# Patient Record
Sex: FEMALE | HISPANIC OR LATINO | URBAN - METROPOLITAN AREA
[De-identification: names, ages, dates, MRNs, and addresses within clinical notes are randomized per-mention and may not be internally consistent; named-entity substitution may affect disease eponyms.]

---

## 2022-11-01 ENCOUNTER — OFFICE (OUTPATIENT)
Dept: URBAN - METROPOLITAN AREA CLINIC 94 | Facility: CLINIC | Age: 77
Setting detail: OPHTHALMOLOGY
End: 2022-11-01
Payer: COMMERCIAL

## 2022-11-01 DIAGNOSIS — Z20.822: ICD-10-CM

## 2022-11-01 DIAGNOSIS — Z01.812: ICD-10-CM

## 2022-11-01 PROCEDURE — 99211 OFF/OP EST MAY X REQ PHY/QHP: CPT | Performed by: OPHTHALMOLOGY

## 2022-11-01 ASSESSMENT — REFRACTION_CURRENTRX
OD_VPRISM_DIRECTION: PROGS
OS_SPHERE: -0.5-
OS_SPHERE: +0.50
OS_CYLINDER: -1.00
OD_SPHERE: +0.50
OS_OVR_VA: 20/
OS_AXIS: 78
OD_ADD: +2.50
OD_OVR_VA: 20/
OD_OVR_VA: 20/
OS_OVR_VA: 20/
OS_ADD: +2.25
OS_AXIS: 097
OD_AXIS: 112
OS_CYLINDER: -1.00
OS_ADD: +2.50
OD_CYLINDER: -1.50
OD_AXIS: 113
OD_CYLINDER: -1.25
OD_SPHERE: -0.50
OD_ADD: +2.25

## 2022-11-01 ASSESSMENT — REFRACTION_MANIFEST
OS_VA1: 20/25
OS_CYLINDER: -2.00
OS_ADD: +2.50
OD_AXIS: 110
OD_SPHERE: PL
OS_CYLINDER: -1.25
OD_CYLINDER: -1.25
OS_AXIS: 088
OD_VA1: 20/20
OD_VA1: 20/25
OD_SPHERE: 0.00
OD_ADD: +2.50
OD_AXIS: 115
OS_AXIS: 85
OS_SPHERE: PL
OS_VA1: 20/20
OS_SPHERE: +0.25
OD_CYLINDER: -2.00

## 2022-11-01 ASSESSMENT — SPHEQUIV_DERIVED
OS_SPHEQUIV: -0.75
OD_SPHEQUIV: -1
OS_SPHEQUIV: -0.75
OD_SPHEQUIV: -1

## 2022-11-01 ASSESSMENT — REFRACTION_AUTOREFRACTION
OS_AXIS: 088
OD_AXIS: 115
OD_SPHERE: 0.00
OS_SPHERE: +0.25
OS_CYLINDER: -2.00
OD_CYLINDER: -2.00

## 2022-11-01 ASSESSMENT — AXIALLENGTH_DERIVED
OD_AL: 23.7066
OS_AL: 23.5627
OS_AL: 23.5627
OD_AL: 23.7066

## 2022-11-01 ASSESSMENT — KERATOMETRY
OD_K2POWER_DIOPTERS: 44.75
OS_K2POWER_DIOPTERS: 45.00
OD_K1POWER_DIOPTERS: 43.75
METHOD_AUTO_MANUAL: AUTO
OD_AXISANGLE_DEGREES: 035
OS_AXISANGLE_DEGREES: 177
OS_K1POWER_DIOPTERS: 43.75

## 2022-11-01 ASSESSMENT — TEAR BREAK UP TIME (TBUT)
OS_TBUT: 2+
OD_TBUT: 1+

## 2022-11-01 ASSESSMENT — VISUAL ACUITY
OD_BCVA: 20/25
OS_BCVA: 20/25-1

## 2022-11-04 ENCOUNTER — ASC (OUTPATIENT)
Dept: URBAN - METROPOLITAN AREA SURGERY 8 | Facility: SURGERY | Age: 77
Setting detail: OPHTHALMOLOGY
End: 2022-11-04
Payer: COMMERCIAL

## 2022-11-04 DIAGNOSIS — H26.492: ICD-10-CM

## 2022-11-04 PROCEDURE — 66821 AFTER CATARACT LASER SURGERY: CPT | Performed by: OPHTHALMOLOGY

## 2022-11-04 ASSESSMENT — AXIALLENGTH_DERIVED
OS_AL: 23.5627
OD_AL: 23.7066
OS_AL: 23.5627
OD_AL: 23.7066

## 2022-11-04 ASSESSMENT — SPHEQUIV_DERIVED
OS_SPHEQUIV: -0.75
OD_SPHEQUIV: -1
OS_SPHEQUIV: -0.75
OD_SPHEQUIV: -1

## 2022-11-04 ASSESSMENT — REFRACTION_MANIFEST
OD_CYLINDER: -2.00
OS_VA1: 20/20
OS_CYLINDER: -2.00
OD_AXIS: 115
OD_VA1: 20/25
OS_AXIS: 088
OD_SPHERE: PL
OD_SPHERE: 0.00
OS_SPHERE: +0.25
OD_ADD: +2.50
OD_VA1: 20/20
OS_SPHERE: PL
OD_CYLINDER: -1.25
OS_VA1: 20/25
OS_CYLINDER: -1.25
OD_AXIS: 110
OS_AXIS: 85
OS_ADD: +2.50

## 2022-11-04 ASSESSMENT — REFRACTION_AUTOREFRACTION
OD_SPHERE: 0.00
OD_AXIS: 115
OS_SPHERE: +0.25
OD_CYLINDER: -2.00
OS_AXIS: 088
OS_CYLINDER: -2.00

## 2022-11-04 ASSESSMENT — REFRACTION_CURRENTRX
OD_CYLINDER: -1.25
OS_CYLINDER: -1.00
OS_ADD: +2.50
OS_OVR_VA: 20/
OD_SPHERE: +0.50
OD_CYLINDER: -1.50
OS_CYLINDER: -1.00
OS_SPHERE: +0.50
OD_OVR_VA: 20/
OS_AXIS: 097
OS_SPHERE: -0.5-
OD_ADD: +2.50
OD_ADD: +2.25
OD_SPHERE: -0.50
OS_AXIS: 78
OD_OVR_VA: 20/
OS_OVR_VA: 20/
OD_AXIS: 113
OD_AXIS: 112
OS_ADD: +2.25
OD_VPRISM_DIRECTION: PROGS

## 2022-11-04 ASSESSMENT — KERATOMETRY
OD_K1POWER_DIOPTERS: 43.75
METHOD_AUTO_MANUAL: AUTO
OS_AXISANGLE_DEGREES: 177
OD_AXISANGLE_DEGREES: 035
OD_K2POWER_DIOPTERS: 44.75
OS_K1POWER_DIOPTERS: 43.75
OS_K2POWER_DIOPTERS: 45.00

## 2022-11-04 ASSESSMENT — TEAR BREAK UP TIME (TBUT)
OS_TBUT: 2+
OD_TBUT: 1+

## 2022-11-04 ASSESSMENT — VISUAL ACUITY
OD_BCVA: 20/25
OS_BCVA: 20/25-1

## 2022-12-02 ENCOUNTER — OFFICE (OUTPATIENT)
Dept: URBAN - METROPOLITAN AREA CLINIC 94 | Facility: CLINIC | Age: 77
Setting detail: OPHTHALMOLOGY
End: 2022-12-02
Payer: COMMERCIAL

## 2022-12-02 DIAGNOSIS — H26.492: ICD-10-CM

## 2022-12-02 DIAGNOSIS — H26.491: ICD-10-CM

## 2022-12-02 PROCEDURE — 99024 POSTOP FOLLOW-UP VISIT: CPT | Performed by: OPHTHALMOLOGY

## 2022-12-02 ASSESSMENT — REFRACTION_MANIFEST
OS_AXIS: 85
OS_SPHERE: +0.25
OD_VA1: 20/25
OD_SPHERE: 0.00
OS_CYLINDER: -2.00
OS_SPHERE: PL
OS_VA1: 20/25
OS_VA1: 20/20
OD_SPHERE: PL
OS_AXIS: 088
OS_ADD: +2.50
OD_VA1: 20/20
OD_AXIS: 115
OD_AXIS: 110
OS_CYLINDER: -1.25
OD_CYLINDER: -2.00
OD_ADD: +2.50
OD_CYLINDER: -1.25

## 2022-12-02 ASSESSMENT — AXIALLENGTH_DERIVED
OD_AL: 23.7997
OS_AL: 23.517
OD_AL: 23.6519
OS_AL: 23.3728

## 2022-12-02 ASSESSMENT — KERATOMETRY
OS_K2POWER_DIOPTERS: 45.00
OS_K1POWER_DIOPTERS: 44.00
OD_K2POWER_DIOPTERS: 44.50
OD_K1POWER_DIOPTERS: 43.50
OS_AXISANGLE_DEGREES: 174
METHOD_AUTO_MANUAL: AUTO
OD_AXISANGLE_DEGREES: 032

## 2022-12-02 ASSESSMENT — REFRACTION_CURRENTRX
OD_VPRISM_DIRECTION: PROGS
OD_ADD: +2.50
OD_OVR_VA: 20/
OS_SPHERE: +0.50
OS_ADD: +2.50
OS_AXIS: 78
OS_CYLINDER: -1.00
OD_SPHERE: +0.50
OD_AXIS: 112
OD_CYLINDER: -1.50
OD_OVR_VA: 20/
OD_ADD: +2.25
OS_OVR_VA: 20/
OS_ADD: +2.25
OS_OVR_VA: 20/
OD_CYLINDER: -1.25
OS_SPHERE: -0.5-
OD_SPHERE: -0.50
OS_AXIS: 097
OS_CYLINDER: -1.00
OD_AXIS: 113

## 2022-12-02 ASSESSMENT — VISUAL ACUITY
OS_BCVA: 20/20
OD_BCVA: 20/25-1

## 2022-12-02 ASSESSMENT — SPHEQUIV_DERIVED
OD_SPHEQUIV: -1
OS_SPHEQUIV: -0.375
OS_SPHEQUIV: -0.75
OD_SPHEQUIV: -0.625

## 2022-12-02 ASSESSMENT — REFRACTION_AUTOREFRACTION
OS_CYLINDER: -1.75
OS_SPHERE: +0.50
OD_SPHERE: +0.25
OD_CYLINDER: -1.75
OS_AXIS: 089
OD_AXIS: 113

## 2022-12-02 ASSESSMENT — TEAR BREAK UP TIME (TBUT)
OS_TBUT: 2+
OD_TBUT: 1+

## 2022-12-02 ASSESSMENT — CONFRONTATIONAL VISUAL FIELD TEST (CVF)
OS_FINDINGS: FULL
OD_FINDINGS: FULL

## 2022-12-02 ASSESSMENT — TONOMETRY
OD_IOP_MMHG: 18
OS_IOP_MMHG: 21

## 2023-03-10 ENCOUNTER — OFFICE (OUTPATIENT)
Dept: URBAN - METROPOLITAN AREA CLINIC 94 | Facility: CLINIC | Age: 78
Setting detail: OPHTHALMOLOGY
End: 2023-03-10
Payer: COMMERCIAL

## 2023-03-10 DIAGNOSIS — H04.123: ICD-10-CM

## 2023-03-10 DIAGNOSIS — H43.393: ICD-10-CM

## 2023-03-10 PROCEDURE — 99213 OFFICE O/P EST LOW 20 MIN: CPT | Performed by: OPHTHALMOLOGY

## 2023-03-10 ASSESSMENT — KERATOMETRY
OS_K2POWER_DIOPTERS: 45.00
OD_K2POWER_DIOPTERS: 44.75
OS_AXISANGLE_DEGREES: 176
OS_K1POWER_DIOPTERS: 43.50
METHOD_AUTO_MANUAL: AUTO
OD_AXISANGLE_DEGREES: 030
OD_K1POWER_DIOPTERS: 43.25

## 2023-03-10 ASSESSMENT — REFRACTION_MANIFEST
OS_CYLINDER: -2.00
OS_ADD: +2.50
OS_CYLINDER: -1.25
OD_SPHERE: PL
OD_VA1: 20/20
OS_SPHERE: +0.25
OD_SPHERE: 0.00
OS_AXIS: 85
OS_VA1: 20/20
OS_VA1: 20/25
OD_CYLINDER: -2.00
OD_CYLINDER: -1.25
OD_AXIS: 110
OD_ADD: +2.50
OS_AXIS: 088
OD_VA1: 20/25
OS_SPHERE: PL
OD_AXIS: 115

## 2023-03-10 ASSESSMENT — AXIALLENGTH_DERIVED
OS_AL: 23.3673
OS_AL: 23.6086
OD_AL: 23.7997
OD_AL: 23.6519

## 2023-03-10 ASSESSMENT — CONFRONTATIONAL VISUAL FIELD TEST (CVF)
OS_FINDINGS: FULL
OD_FINDINGS: FULL

## 2023-03-10 ASSESSMENT — REFRACTION_CURRENTRX
OD_CYLINDER: -1.25
OD_SPHERE: +0.50
OD_AXIS: 113
OD_SPHERE: -0.50
OD_AXIS: 112
OS_AXIS: 097
OS_CYLINDER: -1.00
OD_VPRISM_DIRECTION: PROGS
OD_ADD: +2.25
OD_OVR_VA: 20/
OS_SPHERE: -0.5-
OS_CYLINDER: -1.00
OS_OVR_VA: 20/
OS_OVR_VA: 20/
OS_SPHERE: +0.50
OD_CYLINDER: -1.50
OD_OVR_VA: 20/
OS_ADD: +2.50
OD_ADD: +2.50
OS_ADD: +2.25
OS_AXIS: 78

## 2023-03-10 ASSESSMENT — TEAR BREAK UP TIME (TBUT)
OD_TBUT: 1+
OS_TBUT: 2+

## 2023-03-10 ASSESSMENT — REFRACTION_AUTOREFRACTION
OD_SPHERE: +0.50
OD_AXIS: 114
OS_SPHERE: +1.00
OS_AXIS: 087
OS_CYLINDER: -2.25
OD_CYLINDER: -2.25

## 2023-03-10 ASSESSMENT — TONOMETRY
OS_IOP_MMHG: 12
OD_IOP_MMHG: 12

## 2023-03-10 ASSESSMENT — VISUAL ACUITY
OD_BCVA: 20/20
OS_BCVA: 20/20

## 2023-03-10 ASSESSMENT — SPHEQUIV_DERIVED
OD_SPHEQUIV: -0.625
OS_SPHEQUIV: -0.75
OS_SPHEQUIV: -0.125
OD_SPHEQUIV: -1

## 2023-09-08 ENCOUNTER — OFFICE (OUTPATIENT)
Dept: URBAN - METROPOLITAN AREA CLINIC 94 | Facility: CLINIC | Age: 78
Setting detail: OPHTHALMOLOGY
End: 2023-09-08
Payer: MEDICARE

## 2023-09-08 DIAGNOSIS — H43.393: ICD-10-CM

## 2023-09-08 DIAGNOSIS — H04.123: ICD-10-CM

## 2023-09-08 PROCEDURE — 92250 FUNDUS PHOTOGRAPHY W/I&R: CPT | Performed by: OPHTHALMOLOGY

## 2023-09-08 PROCEDURE — 92014 COMPRE OPH EXAM EST PT 1/>: CPT | Performed by: OPHTHALMOLOGY

## 2023-09-08 ASSESSMENT — KERATOMETRY
OS_K2POWER_DIOPTERS: 45.00
METHOD_AUTO_MANUAL: AUTO
OS_AXISANGLE_DEGREES: 176
OS_K1POWER_DIOPTERS: 43.50
OD_AXISANGLE_DEGREES: 030
OD_K1POWER_DIOPTERS: 43.25
OD_K2POWER_DIOPTERS: 44.75

## 2023-09-08 ASSESSMENT — REFRACTION_MANIFEST
OD_CYLINDER: -2.00
OD_VA1: 20/25
OD_SPHERE: PL
OD_AXIS: 110
OD_VA1: 20/20
OS_CYLINDER: -1.25
OS_ADD: +2.50
OS_AXIS: 088
OS_CYLINDER: -2.00
OS_VA1: 20/25
OD_AXIS: 115
OD_SPHERE: 0.00
OD_CYLINDER: -1.25
OS_SPHERE: PL
OD_ADD: +2.50
OS_VA1: 20/20
OS_AXIS: 85
OS_SPHERE: +0.25

## 2023-09-08 ASSESSMENT — REFRACTION_AUTOREFRACTION
OS_CYLINDER: -2.00
OS_AXIS: 087
OS_SPHERE: +0.50
OD_SPHERE: 0.00
OD_CYLINDER: -1.50
OD_AXIS: 111

## 2023-09-08 ASSESSMENT — TEAR BREAK UP TIME (TBUT)
OS_TBUT: 2+
OD_TBUT: 1+

## 2023-09-08 ASSESSMENT — LID POSITION - PTOSIS
OS_PTOSIS: 1+
OD_PTOSIS: 1+

## 2023-09-08 ASSESSMENT — REFRACTION_CURRENTRX
OS_ADD: +2.25
OD_OVR_VA: 20/
OD_AXIS: 112
OD_CYLINDER: -1.50
OD_OVR_VA: 20/
OD_AXIS: 113
OS_AXIS: 78
OD_CYLINDER: -1.25
OS_CYLINDER: -1.00
OD_SPHERE: +0.50
OS_SPHERE: +0.50
OS_ADD: +2.50
OS_CYLINDER: -1.00
OD_SPHERE: -0.50
OD_VPRISM_DIRECTION: PROGS
OD_ADD: +2.50
OS_OVR_VA: 20/
OD_ADD: +2.25
OS_OVR_VA: 20/
OS_AXIS: 097
OS_SPHERE: -0.5-

## 2023-09-08 ASSESSMENT — AXIALLENGTH_DERIVED
OD_AL: 23.701
OS_AL: 23.6086
OS_AL: 23.5115
OD_AL: 23.7997

## 2023-09-08 ASSESSMENT — SPHEQUIV_DERIVED
OD_SPHEQUIV: -1
OD_SPHEQUIV: -0.75
OS_SPHEQUIV: -0.5
OS_SPHEQUIV: -0.75

## 2023-09-08 ASSESSMENT — VISUAL ACUITY
OS_BCVA: 20/25
OD_BCVA: 20/25

## 2023-09-08 ASSESSMENT — TONOMETRY
OD_IOP_MMHG: 20
OS_IOP_MMHG: 20

## 2023-09-08 ASSESSMENT — CONFRONTATIONAL VISUAL FIELD TEST (CVF)
OS_FINDINGS: FULL
OD_FINDINGS: FULL

## 2023-09-22 ENCOUNTER — OFFICE (OUTPATIENT)
Dept: URBAN - METROPOLITAN AREA CLINIC 94 | Facility: CLINIC | Age: 78
Setting detail: OPHTHALMOLOGY
End: 2023-09-22
Payer: MEDICARE

## 2023-09-22 DIAGNOSIS — H02.521: ICD-10-CM

## 2023-09-22 DIAGNOSIS — H04.123: ICD-10-CM

## 2023-09-22 DIAGNOSIS — H02.524: ICD-10-CM

## 2023-09-22 DIAGNOSIS — H02.423: ICD-10-CM

## 2023-09-22 DIAGNOSIS — H02.831: ICD-10-CM

## 2023-09-22 PROBLEM — H02.422 PTOSIS MYOGENIC; RIGHT EYE, LEFT EYE, BOTH EYES: Status: ACTIVE | Noted: 2023-09-22

## 2023-09-22 PROBLEM — H02.834 DERMATOCHALASIS; RIGHT UPPER LID, LEFT UPPER LID: Status: ACTIVE | Noted: 2023-09-22

## 2023-09-22 PROBLEM — H02.421 PTOSIS MYOGENIC; RIGHT EYE, LEFT EYE, BOTH EYES: Status: ACTIVE | Noted: 2023-09-22

## 2023-09-22 PROCEDURE — 92082 INTERMEDIATE VISUAL FIELD XM: CPT | Performed by: OPHTHALMOLOGY

## 2023-09-22 PROCEDURE — 92285 EXTERNAL OCULAR PHOTOGRAPHY: CPT | Performed by: OPHTHALMOLOGY

## 2023-09-22 PROCEDURE — 99214 OFFICE O/P EST MOD 30 MIN: CPT | Performed by: OPHTHALMOLOGY

## 2023-09-22 PROCEDURE — 83861 MICROFLUID ANALY TEARS: CPT | Performed by: OPHTHALMOLOGY

## 2023-09-22 ASSESSMENT — SPHEQUIV_DERIVED
OS_SPHEQUIV: -0.375
OS_SPHEQUIV: -0.75
OD_SPHEQUIV: -1
OD_SPHEQUIV: -0.75

## 2023-09-22 ASSESSMENT — REFRACTION_MANIFEST
OS_CYLINDER: -2.00
OD_AXIS: 115
OD_ADD: +2.50
OD_CYLINDER: -2.00
OD_AXIS: 110
OD_CYLINDER: -1.25
OS_AXIS: 85
OS_ADD: +2.50
OS_VA1: 20/25
OD_VA1: 20/25
OS_AXIS: 088
OD_VA1: 20/20
OD_SPHERE: 0.00
OD_SPHERE: PL
OS_SPHERE: PL
OS_VA1: 20/20
OS_CYLINDER: -1.25
OS_SPHERE: +0.25

## 2023-09-22 ASSESSMENT — REFRACTION_CURRENTRX
OD_ADD: +2.25
OS_SPHERE: +0.50
OS_AXIS: 78
OD_CYLINDER: -1.50
OS_OVR_VA: 20/
OS_SPHERE: -0.5-
OS_AXIS: 097
OD_VPRISM_DIRECTION: PROGS
OD_OVR_VA: 20/
OD_AXIS: 112
OS_ADD: +2.25
OS_CYLINDER: -1.00
OD_SPHERE: -0.50
OD_SPHERE: +0.50
OS_CYLINDER: -1.00
OS_ADD: +2.50
OD_OVR_VA: 20/
OD_ADD: +2.50
OS_OVR_VA: 20/
OD_CYLINDER: -1.25
OD_AXIS: 113

## 2023-09-22 ASSESSMENT — REFRACTION_AUTOREFRACTION
OS_SPHERE: +0.75
OD_CYLINDER: -2.00
OS_AXIS: 094
OD_SPHERE: +0.25
OS_CYLINDER: -2.25
OD_AXIS: 115

## 2023-09-22 ASSESSMENT — TEAR BREAK UP TIME (TBUT)
OS_TBUT: 2+
OD_TBUT: 1+

## 2023-09-22 ASSESSMENT — TONOMETRY
OD_IOP_MMHG: 18
OS_IOP_MMHG: 19

## 2023-09-22 ASSESSMENT — KERATOMETRY
OS_AXISANGLE_DEGREES: 180
OD_AXISANGLE_DEGREES: 030
OD_K1POWER_DIOPTERS: 43.50
OD_K2POWER_DIOPTERS: 44.75
OS_K2POWER_DIOPTERS: 45.00
METHOD_AUTO_MANUAL: AUTO
OS_K1POWER_DIOPTERS: 44.00

## 2023-09-22 ASSESSMENT — CONFRONTATIONAL VISUAL FIELD TEST (CVF)
OD_FINDINGS: FULL
OS_FINDINGS: FULL

## 2023-09-22 ASSESSMENT — AXIALLENGTH_DERIVED
OS_AL: 23.517
OD_AL: 23.753
OD_AL: 23.6547
OS_AL: 23.3728

## 2023-09-22 ASSESSMENT — VISUAL ACUITY
OD_BCVA: 20/25-1
OS_BCVA: 20/25

## 2023-10-17 ENCOUNTER — INPATIENT (INPATIENT)
Facility: HOSPITAL | Age: 78
LOS: 0 days | Discharge: HOME | DRG: 310 | End: 2023-10-18
Attending: STUDENT IN AN ORGANIZED HEALTH CARE EDUCATION/TRAINING PROGRAM | Admitting: STUDENT IN AN ORGANIZED HEALTH CARE EDUCATION/TRAINING PROGRAM
Payer: MEDICARE

## 2023-10-17 VITALS
HEIGHT: 57 IN | RESPIRATION RATE: 20 BRPM | HEART RATE: 70 BPM | DIASTOLIC BLOOD PRESSURE: 65 MMHG | SYSTOLIC BLOOD PRESSURE: 98 MMHG | OXYGEN SATURATION: 98 % | WEIGHT: 112.44 LBS | TEMPERATURE: 98 F

## 2023-10-17 DIAGNOSIS — I48.91 UNSPECIFIED ATRIAL FIBRILLATION: ICD-10-CM

## 2023-10-17 LAB
ALBUMIN SERPL ELPH-MCNC: 4.1 G/DL — SIGNIFICANT CHANGE UP (ref 3.3–5.2)
ALBUMIN SERPL ELPH-MCNC: 4.1 G/DL — SIGNIFICANT CHANGE UP (ref 3.3–5.2)
ALP SERPL-CCNC: 110 U/L — SIGNIFICANT CHANGE UP (ref 40–120)
ALP SERPL-CCNC: 110 U/L — SIGNIFICANT CHANGE UP (ref 40–120)
ALT FLD-CCNC: 29 U/L — SIGNIFICANT CHANGE UP
ALT FLD-CCNC: 29 U/L — SIGNIFICANT CHANGE UP
ANION GAP SERPL CALC-SCNC: 13 MMOL/L — SIGNIFICANT CHANGE UP (ref 5–17)
ANION GAP SERPL CALC-SCNC: 13 MMOL/L — SIGNIFICANT CHANGE UP (ref 5–17)
APTT BLD: 37.7 SEC — HIGH (ref 24.5–35.6)
APTT BLD: 37.7 SEC — HIGH (ref 24.5–35.6)
AST SERPL-CCNC: 21 U/L — SIGNIFICANT CHANGE UP
AST SERPL-CCNC: 21 U/L — SIGNIFICANT CHANGE UP
BASOPHILS # BLD AUTO: 0.05 K/UL — SIGNIFICANT CHANGE UP (ref 0–0.2)
BASOPHILS # BLD AUTO: 0.05 K/UL — SIGNIFICANT CHANGE UP (ref 0–0.2)
BASOPHILS NFR BLD AUTO: 0.4 % — SIGNIFICANT CHANGE UP (ref 0–2)
BASOPHILS NFR BLD AUTO: 0.4 % — SIGNIFICANT CHANGE UP (ref 0–2)
BILIRUB SERPL-MCNC: 0.6 MG/DL — SIGNIFICANT CHANGE UP (ref 0.4–2)
BILIRUB SERPL-MCNC: 0.6 MG/DL — SIGNIFICANT CHANGE UP (ref 0.4–2)
BUN SERPL-MCNC: 11.9 MG/DL — SIGNIFICANT CHANGE UP (ref 8–20)
BUN SERPL-MCNC: 11.9 MG/DL — SIGNIFICANT CHANGE UP (ref 8–20)
CALCIUM SERPL-MCNC: 9.8 MG/DL — SIGNIFICANT CHANGE UP (ref 8.4–10.5)
CALCIUM SERPL-MCNC: 9.8 MG/DL — SIGNIFICANT CHANGE UP (ref 8.4–10.5)
CHLORIDE SERPL-SCNC: 101 MMOL/L — SIGNIFICANT CHANGE UP (ref 96–108)
CHLORIDE SERPL-SCNC: 101 MMOL/L — SIGNIFICANT CHANGE UP (ref 96–108)
CO2 SERPL-SCNC: 26 MMOL/L — SIGNIFICANT CHANGE UP (ref 22–29)
CO2 SERPL-SCNC: 26 MMOL/L — SIGNIFICANT CHANGE UP (ref 22–29)
CREAT SERPL-MCNC: 0.69 MG/DL — SIGNIFICANT CHANGE UP (ref 0.5–1.3)
CREAT SERPL-MCNC: 0.69 MG/DL — SIGNIFICANT CHANGE UP (ref 0.5–1.3)
EGFR: 90 ML/MIN/1.73M2 — SIGNIFICANT CHANGE UP
EGFR: 90 ML/MIN/1.73M2 — SIGNIFICANT CHANGE UP
EOSINOPHIL # BLD AUTO: 0.02 K/UL — SIGNIFICANT CHANGE UP (ref 0–0.5)
EOSINOPHIL # BLD AUTO: 0.02 K/UL — SIGNIFICANT CHANGE UP (ref 0–0.5)
EOSINOPHIL NFR BLD AUTO: 0.2 % — SIGNIFICANT CHANGE UP (ref 0–6)
EOSINOPHIL NFR BLD AUTO: 0.2 % — SIGNIFICANT CHANGE UP (ref 0–6)
GLUCOSE SERPL-MCNC: 135 MG/DL — HIGH (ref 70–99)
GLUCOSE SERPL-MCNC: 135 MG/DL — HIGH (ref 70–99)
HCT VFR BLD CALC: 40.1 % — SIGNIFICANT CHANGE UP (ref 34.5–45)
HCT VFR BLD CALC: 40.1 % — SIGNIFICANT CHANGE UP (ref 34.5–45)
HGB BLD-MCNC: 13.4 G/DL — SIGNIFICANT CHANGE UP (ref 11.5–15.5)
HGB BLD-MCNC: 13.4 G/DL — SIGNIFICANT CHANGE UP (ref 11.5–15.5)
IMM GRANULOCYTES NFR BLD AUTO: 0.2 % — SIGNIFICANT CHANGE UP (ref 0–0.9)
IMM GRANULOCYTES NFR BLD AUTO: 0.2 % — SIGNIFICANT CHANGE UP (ref 0–0.9)
LYMPHOCYTES # BLD AUTO: 19.4 % — SIGNIFICANT CHANGE UP (ref 13–44)
LYMPHOCYTES # BLD AUTO: 19.4 % — SIGNIFICANT CHANGE UP (ref 13–44)
LYMPHOCYTES # BLD AUTO: 2.48 K/UL — SIGNIFICANT CHANGE UP (ref 1–3.3)
LYMPHOCYTES # BLD AUTO: 2.48 K/UL — SIGNIFICANT CHANGE UP (ref 1–3.3)
MAGNESIUM SERPL-MCNC: 2.2 MG/DL — SIGNIFICANT CHANGE UP (ref 1.6–2.6)
MAGNESIUM SERPL-MCNC: 2.2 MG/DL — SIGNIFICANT CHANGE UP (ref 1.6–2.6)
MCHC RBC-ENTMCNC: 28.1 PG — SIGNIFICANT CHANGE UP (ref 27–34)
MCHC RBC-ENTMCNC: 28.1 PG — SIGNIFICANT CHANGE UP (ref 27–34)
MCHC RBC-ENTMCNC: 33.4 GM/DL — SIGNIFICANT CHANGE UP (ref 32–36)
MCHC RBC-ENTMCNC: 33.4 GM/DL — SIGNIFICANT CHANGE UP (ref 32–36)
MCV RBC AUTO: 84.1 FL — SIGNIFICANT CHANGE UP (ref 80–100)
MCV RBC AUTO: 84.1 FL — SIGNIFICANT CHANGE UP (ref 80–100)
MONOCYTES # BLD AUTO: 1.12 K/UL — HIGH (ref 0–0.9)
MONOCYTES # BLD AUTO: 1.12 K/UL — HIGH (ref 0–0.9)
MONOCYTES NFR BLD AUTO: 8.8 % — SIGNIFICANT CHANGE UP (ref 2–14)
MONOCYTES NFR BLD AUTO: 8.8 % — SIGNIFICANT CHANGE UP (ref 2–14)
NEUTROPHILS # BLD AUTO: 9.1 K/UL — HIGH (ref 1.8–7.4)
NEUTROPHILS # BLD AUTO: 9.1 K/UL — HIGH (ref 1.8–7.4)
NEUTROPHILS NFR BLD AUTO: 71 % — SIGNIFICANT CHANGE UP (ref 43–77)
NEUTROPHILS NFR BLD AUTO: 71 % — SIGNIFICANT CHANGE UP (ref 43–77)
NT-PROBNP SERPL-SCNC: 488 PG/ML — HIGH (ref 0–300)
NT-PROBNP SERPL-SCNC: 488 PG/ML — HIGH (ref 0–300)
PLATELET # BLD AUTO: 280 K/UL — SIGNIFICANT CHANGE UP (ref 150–400)
PLATELET # BLD AUTO: 280 K/UL — SIGNIFICANT CHANGE UP (ref 150–400)
POTASSIUM SERPL-MCNC: 4 MMOL/L — SIGNIFICANT CHANGE UP (ref 3.5–5.3)
POTASSIUM SERPL-MCNC: 4 MMOL/L — SIGNIFICANT CHANGE UP (ref 3.5–5.3)
POTASSIUM SERPL-SCNC: 4 MMOL/L — SIGNIFICANT CHANGE UP (ref 3.5–5.3)
POTASSIUM SERPL-SCNC: 4 MMOL/L — SIGNIFICANT CHANGE UP (ref 3.5–5.3)
PROT SERPL-MCNC: 6.8 G/DL — SIGNIFICANT CHANGE UP (ref 6.6–8.7)
PROT SERPL-MCNC: 6.8 G/DL — SIGNIFICANT CHANGE UP (ref 6.6–8.7)
RAPID RVP RESULT: SIGNIFICANT CHANGE UP
RAPID RVP RESULT: SIGNIFICANT CHANGE UP
RBC # BLD: 4.77 M/UL — SIGNIFICANT CHANGE UP (ref 3.8–5.2)
RBC # BLD: 4.77 M/UL — SIGNIFICANT CHANGE UP (ref 3.8–5.2)
RBC # FLD: 14.3 % — SIGNIFICANT CHANGE UP (ref 10.3–14.5)
RBC # FLD: 14.3 % — SIGNIFICANT CHANGE UP (ref 10.3–14.5)
SARS-COV-2 RNA SPEC QL NAA+PROBE: SIGNIFICANT CHANGE UP
SARS-COV-2 RNA SPEC QL NAA+PROBE: SIGNIFICANT CHANGE UP
SODIUM SERPL-SCNC: 140 MMOL/L — SIGNIFICANT CHANGE UP (ref 135–145)
SODIUM SERPL-SCNC: 140 MMOL/L — SIGNIFICANT CHANGE UP (ref 135–145)
TROPONIN T SERPL-MCNC: <0.01 NG/ML — SIGNIFICANT CHANGE UP (ref 0–0.06)
WBC # BLD: 12.8 K/UL — HIGH (ref 3.8–10.5)
WBC # BLD: 12.8 K/UL — HIGH (ref 3.8–10.5)
WBC # FLD AUTO: 12.8 K/UL — HIGH (ref 3.8–10.5)
WBC # FLD AUTO: 12.8 K/UL — HIGH (ref 3.8–10.5)

## 2023-10-17 PROCEDURE — 99223 1ST HOSP IP/OBS HIGH 75: CPT

## 2023-10-17 PROCEDURE — 99285 EMERGENCY DEPT VISIT HI MDM: CPT

## 2023-10-17 PROCEDURE — 93010 ELECTROCARDIOGRAM REPORT: CPT | Mod: 76

## 2023-10-17 PROCEDURE — 99222 1ST HOSP IP/OBS MODERATE 55: CPT

## 2023-10-17 PROCEDURE — 71045 X-RAY EXAM CHEST 1 VIEW: CPT | Mod: 26

## 2023-10-17 PROCEDURE — 93306 TTE W/DOPPLER COMPLETE: CPT | Mod: 26

## 2023-10-17 RX ORDER — ACETAMINOPHEN 500 MG
650 TABLET ORAL EVERY 6 HOURS
Refills: 0 | Status: DISCONTINUED | OUTPATIENT
Start: 2023-10-17 | End: 2023-10-18

## 2023-10-17 RX ORDER — FAMOTIDINE 10 MG/ML
20 INJECTION INTRAVENOUS DAILY
Refills: 0 | Status: DISCONTINUED | OUTPATIENT
Start: 2023-10-17 | End: 2023-10-18

## 2023-10-17 RX ORDER — ENOXAPARIN SODIUM 100 MG/ML
50 INJECTION SUBCUTANEOUS EVERY 12 HOURS
Refills: 0 | Status: DISCONTINUED | OUTPATIENT
Start: 2023-10-17 | End: 2023-10-18

## 2023-10-17 RX ORDER — ATORVASTATIN CALCIUM 80 MG/1
1 TABLET, FILM COATED ORAL
Refills: 0 | DISCHARGE

## 2023-10-17 RX ORDER — CALCIUM CARBONATE 500(1250)
0 TABLET ORAL
Refills: 0 | DISCHARGE

## 2023-10-17 RX ORDER — METOPROLOL TARTRATE 50 MG
25 TABLET ORAL
Refills: 0 | Status: DISCONTINUED | OUTPATIENT
Start: 2023-10-17 | End: 2023-10-18

## 2023-10-17 RX ORDER — HYDROCHLOROTHIAZIDE 25 MG
1 TABLET ORAL
Refills: 0 | DISCHARGE

## 2023-10-17 RX ORDER — HEPARIN SODIUM 5000 [USP'U]/ML
4000 INJECTION INTRAVENOUS; SUBCUTANEOUS ONCE
Refills: 0 | Status: DISCONTINUED | OUTPATIENT
Start: 2023-10-17 | End: 2023-10-17

## 2023-10-17 RX ORDER — ALENDRONATE SODIUM 70 MG/1
1 TABLET ORAL
Refills: 0 | DISCHARGE

## 2023-10-17 RX ORDER — ATORVASTATIN CALCIUM 80 MG/1
20 TABLET, FILM COATED ORAL AT BEDTIME
Refills: 0 | Status: DISCONTINUED | OUTPATIENT
Start: 2023-10-17 | End: 2023-10-18

## 2023-10-17 RX ORDER — MIRABEGRON 50 MG/1
1 TABLET, EXTENDED RELEASE ORAL
Refills: 0 | DISCHARGE

## 2023-10-17 RX ORDER — DILTIAZEM HCL 120 MG
13 CAPSULE, EXT RELEASE 24 HR ORAL ONCE
Refills: 0 | Status: COMPLETED | OUTPATIENT
Start: 2023-10-17 | End: 2023-10-17

## 2023-10-17 RX ORDER — SODIUM CHLORIDE 9 MG/ML
1000 INJECTION INTRAMUSCULAR; INTRAVENOUS; SUBCUTANEOUS ONCE
Refills: 0 | Status: COMPLETED | OUTPATIENT
Start: 2023-10-17 | End: 2023-10-17

## 2023-10-17 RX ORDER — ACETAMINOPHEN 500 MG
650 TABLET ORAL ONCE
Refills: 0 | Status: COMPLETED | OUTPATIENT
Start: 2023-10-17 | End: 2023-10-17

## 2023-10-17 RX ORDER — LANOLIN ALCOHOL/MO/W.PET/CERES
3 CREAM (GRAM) TOPICAL AT BEDTIME
Refills: 0 | Status: DISCONTINUED | OUTPATIENT
Start: 2023-10-17 | End: 2023-10-18

## 2023-10-17 RX ORDER — HEPARIN SODIUM 5000 [USP'U]/ML
2000 INJECTION INTRAVENOUS; SUBCUTANEOUS EVERY 6 HOURS
Refills: 0 | Status: DISCONTINUED | OUTPATIENT
Start: 2023-10-17 | End: 2023-10-17

## 2023-10-17 RX ORDER — HEPARIN SODIUM 5000 [USP'U]/ML
INJECTION INTRAVENOUS; SUBCUTANEOUS
Qty: 25000 | Refills: 0 | Status: DISCONTINUED | OUTPATIENT
Start: 2023-10-17 | End: 2023-10-17

## 2023-10-17 RX ORDER — FAMOTIDINE 10 MG/ML
1 INJECTION INTRAVENOUS
Refills: 0 | DISCHARGE

## 2023-10-17 RX ORDER — HEPARIN SODIUM 5000 [USP'U]/ML
4000 INJECTION INTRAVENOUS; SUBCUTANEOUS EVERY 6 HOURS
Refills: 0 | Status: DISCONTINUED | OUTPATIENT
Start: 2023-10-17 | End: 2023-10-17

## 2023-10-17 RX ORDER — DILTIAZEM HCL 120 MG
30 CAPSULE, EXT RELEASE 24 HR ORAL ONCE
Refills: 0 | Status: COMPLETED | OUTPATIENT
Start: 2023-10-17 | End: 2023-10-17

## 2023-10-17 RX ORDER — ONDANSETRON 8 MG/1
4 TABLET, FILM COATED ORAL EVERY 8 HOURS
Refills: 0 | Status: DISCONTINUED | OUTPATIENT
Start: 2023-10-17 | End: 2023-10-18

## 2023-10-17 RX ADMIN — SODIUM CHLORIDE 1000 MILLILITER(S): 9 INJECTION INTRAMUSCULAR; INTRAVENOUS; SUBCUTANEOUS at 18:45

## 2023-10-17 RX ADMIN — Medication 650 MILLIGRAM(S): at 17:36

## 2023-10-17 RX ADMIN — ATORVASTATIN CALCIUM 20 MILLIGRAM(S): 80 TABLET, FILM COATED ORAL at 23:13

## 2023-10-17 RX ADMIN — Medication 25 MILLIGRAM(S): at 18:45

## 2023-10-17 RX ADMIN — SODIUM CHLORIDE 1000 MILLILITER(S): 9 INJECTION INTRAMUSCULAR; INTRAVENOUS; SUBCUTANEOUS at 15:20

## 2023-10-17 RX ADMIN — SODIUM CHLORIDE 1000 MILLILITER(S): 9 INJECTION INTRAMUSCULAR; INTRAVENOUS; SUBCUTANEOUS at 14:20

## 2023-10-17 RX ADMIN — Medication 30 MILLIGRAM(S): at 14:20

## 2023-10-17 RX ADMIN — Medication 13 MILLIGRAM(S): at 14:00

## 2023-10-17 NOTE — ED ADULT NURSE NOTE - LANGUAGE ASSISTANCE NEEDED
Telephone Encounter by Yuniel Saini MD at 05/15/18 12:31 PM     Author:  Yuniel Saini MD Service:  (none) Author Type:  Physician     Filed:  05/15/18 12:32 PM Encounter Date:  5/15/2018 Status:  Signed     :  Yuniel Saini MD (Physician)            shouldn't need refill[SH1.1M]      Revision History        User Key Date/Time User Provider Type Action    > SH1.1 05/15/18 12:32 PM Yuniel Saini MD Physician Sign    M - Manual             Yes-Patient/Caregiver accepts free interpretation services...

## 2023-10-17 NOTE — CONSULT NOTE ADULT - PROBLEM SELECTOR RECOMMENDATION 9
-New onset   -CHADVASC 4  -Hypotensive and cant tolerate BB at this time. Ordered one additional IV bolus  -TSH normal  -Trop neg  -TTE ordered  -Start hep gtt full AC -New onset   -Not rate controlled   -CHADVASC 4  -Hypotensive and cant tolerate BB at this time. Ordered one additional IV bolus  -TSH normal  -Trop neg  -TTE ordered  -Start hep gtt full AC

## 2023-10-17 NOTE — ED ADULT TRIAGE NOTE - RESPIRATORY RATE (BREATHS/MIN)
20 Complex Repair And Graft Additional Text (Will Appearing After The Standard Complex Repair Text): The complex repair was not sufficient to completely close the primary defect. The remaining additional defect was repaired with the graft mentioned below.

## 2023-10-17 NOTE — CONSULT NOTE ADULT - SUBJECTIVE AND OBJECTIVE BOX
[facility]                                              CARDIOLOGY AT [address], [place] [address]                                             Telephone: [phone]. Fax:[phone]                                                       CARDIOLOGY CONSULTATION NOTE                                                                                             History obtained by: Patient and medical record  Community Cardiologist: none   obtained: Yes [ x ] No [  ] [facility]    Reason for Consultation:   Available out pt records reviewed: Yes [  ] No [  ]    HPI:  Patient is a 78y old  Female PMH HTN, HLD, DM, osteoporosis. Presents with right jaw pain, headache followed by midsternal chest pain pressure / tightness accompanied with SOB with radiation towards posterior back.  Never had prior episodes. Has been ongoing since 1am this morning and when she came to the ED it all resolved. S/p IV / PO cardizem but remain hypotensive. S/p IV bolus 2L.  Off note has been experiencing dyspnea on exertion on and off for the past year. Episodes last 15 mins approximately and resolve with rest, she states "she cant catch her breath". Has familial history of MI. Denies fevers, dizziness, weight gain          CARDIAC TESTING   ECHO:    STRESS:    CATH:     ELECTROPHYSIOLOGY:     PAST MEDICAL HISTORY      PAST SURGICAL HISTORY      SOCIAL HISTORY:  Denies smoking/alcohol/drugs  CIGARETTES:     ALCOHOL:  DRUGS:    FAMILY HISTORY:    Family History of Cardiovascular Disease:  Yes [ x ] No [  ]  Coronary Artery Disease in first degree relative: Yes [  ] No [ x ]  Sudden Cardiac Death in First degree relative: Yes [  ] No [ x ]    HOME MEDICATIONS:      CURRENT CARDIAC MEDICATIONS:      CURRENT OTHER MEDICATIONS:  acetaminophen     Tablet .. 650 milliGRAM(s) Oral once, Stop order after: 1 Doses  sodium chloride 0.9% Bolus 1000 milliLiter(s) IV Bolus once, Stop order after: 1 Doses  sodium chloride 0.9% Bolus 1000 milliLiter(s) IV Bolus once, Stop order after: 1 Doses      ALLERGIES:   No Known Allergies      REVIEW OF SYMPTOMS:   CONSTITUTIONAL: No fever, no chills, no weight loss, no weight gain, no fatigue   ENMT:  No vertigo; No sinus or throat pain  NECK: No pain or stiffness  CARDIOVASCULAR: +chest pain, no dyspnea, no syncope/presyncope, no palpitations, no dizziness, no Orthopnea, no Paroxsymal nocturnal dyspnea  RESPIRATORY: +Shortness of breath, no cough, no wheezing  : No dysuria, no hematuria   GI: No dark color stool, no nausea, no diarrhea, no constipation, no abdominal pain   NEURO: No headache, no slurred speech   MUSCULOSKELETAL: No joint pain or swelling; No muscle, back, or extremity pain  PSYCH: No agitation, no anxiety.    ALL OTHER REVIEW OF SYSTEMS ARE NEGATIVE.    VITAL SIGNS:  T(C): 36.7 (10-17-23 @ 12:54), Max: 36.7 (10-17-23 @ 12:54)  T(F): 98 (10-17-23 @ 12:54), Max: 98 (10-17-23 @ 12:54)  HR: 116 (10-17-23 @ 14:15) (70 - 180)  BP: 97/62 (10-17-23 @ 14:15) (97/62 - 103/60)  RR: 20 (10-17-23 @ 14:15) (20 - 20)  SpO2: 98% (10-17-23 @ 14:15) (98% - 98%)    INTAKE AND OUTPUT:       PHYSICAL EXAM:  Constitutional: Comfortable . No acute distress.   HEENT: Atraumatic and normocephalic , neck is supple . no JVD. No carotid bruit.  CNS: A&Ox3. No focal deficits.   Respiratory: CTAB, unlabored   Cardiovascular: RRR normal s1 s2. No murmur. No rubs or gallop.  Gastrointestinal: Soft, non-tender. +Bowel sounds.   Extremities: 2+ Peripheral Pulses, No clubbing, cyanosis, or edema  Psychiatric: Calm . no agitation.   Skin: Warm and dry, no ulcers on extremities     LABS:  ( 17 Oct 2023 13:54 )  Troponin T  <0.01,  CPK  X    , CKMB  X    , BNP X                                  13.4   12.80 )-----------( 280      ( 17 Oct 2023 13:54 )             40.1     10-17    140  |  101  |  11.9  ----------------------------<  135<H>  4.0   |  26.0  |  0.69    Ca    9.8      17 Oct 2023 13:54  Mg     2.2     10-17    TPro  6.8  /  Alb  4.1  /  TBili  0.6  /  DBili  x   /  AST  21  /  ALT  29  /  AlkPhos  110  10-17      Urinalysis Basic - ( 17 Oct 2023 13:54 )    Color: x / Appearance: x / SG: x / pH: x  Gluc: 135 mg/dL / Ketone: x  / Bili: x / Urobili: x   Blood: x / Protein: x / Nitrite: x   Leuk Esterase: x / RBC: x / WBC x   Sq Epi: x / Non Sq Epi: x / Bacteria: x          Thyroid Stimulating Hormone, Serum: 1.04 uIU/mL (10-17-23 @ 13:54)      INTERPRETATION OF TELEMETRY: afib     ECG: afib   Prior ECG: Yes [x  ] No [  ]    RADIOLOGY & ADDITIONAL STUDIES:    X-ray:  clear

## 2023-10-17 NOTE — H&P ADULT - NSHPPHYSICALEXAM_GEN_ALL_CORE
T(C): 36.7 (10-17-23 @ 12:54), Max: 36.7 (10-17-23 @ 12:54)  HR: 114 (10-17-23 @ 17:21) (70 - 180)  BP: 92/55 (10-17-23 @ 17:21) (92/55 - 103/60)  RR: 19 (10-17-23 @ 17:21) (19 - 20)  SpO2: 98% (10-17-23 @ 17:21) (98% - 98%)    CONSTITUTIONAL: no apparent distress  EYES: PERRLA and symmetric, EOMI, No conjunctival or scleral injection, non-icteric  ENMT: Oral mucosa with moist membranes, no pharyngeal injection or exudates  NECK: Supple, symmetric and without tracheal deviation   RESP: No respiratory distress, no use of accessory muscles; bibasilar crackles heard, no wheezing  CV: Tachycardic,+S1S2, no MRG; no JVD; no peripheral edema  GI: Soft, NT, ND, no rebound, no guarding  MSK: Normal ROM without pain, no spinal tenderness, normal muscle strength/tone  SKIN: No rashes or ulcers noted; no subcutaneous nodules or induration palpable  PSYCH: A+O x 3, mood and affect appropriate  NEURO: motor and sensory function grossly intact

## 2023-10-17 NOTE — H&P ADULT - HISTORY OF PRESENT ILLNESS
77yo female with hx of HTN, HLD, DM, osteoporosis and GERD with recent gastric wall thickening on CT scan (done as outpatient) who presents to the hospital after experiencing sudden chest pain, shortness of breath and palpitations that awoke her from sleep around 1AM today. Pt reports first time experiencing such chest pain. It has been pressure like, located in the center of her chest without radiation. She reports associated SOB and headache but denies nausea, vomiting, sweating. She goes on to report sporadic episodes of SOB over the last year that occur spontaneously, associated with palpitations, last about 15 minutes before resolving spontaneously (states she also takes garlic and other home remedies when it occurs). Episodes could be spaced apart by months but she had two episodes occur this week. Episodes have occurred at rest and exertion with no identifiable trigger.    Pt denies fever, cough, abdominal pain. dysuria, leg pain/swelling, diarrhea, vision changes. Further denies orthopnea, PND, or SAMAYOA. 77yo female with hx of HTN, HLD, DM, osteoporosis and GERD with recent gastric wall thickening on CT scan (done as outpatient) who presents to the hospital after experiencing sudden chest pain, shortness of breath and palpitations that awoke her from sleep around 1AM today. Pt reports first time experiencing such chest pain. It has been pressure like, located in the center of her chest without radiation. She reports associated SOB and headache but denies nausea, vomiting, sweating. She goes on to report sporadic episodes of SOB over the last year that occur spontaneously, associated with palpitations, last about 15 minutes before resolving spontaneously (states she also takes garlic and other home remedies when it occurs). Episodes could be spaced apart by months but she had two episodes occur this week. Episodes have occurred at rest and exertion with no identifiable trigger.    Pt denies fever, cough, abdominal pain. dysuria, trouble swallowing, leg pain/swelling, diarrhea, vision changes. Further denies orthopnea, PND, or SAMAYOA.

## 2023-10-17 NOTE — H&P ADULT - NSHPLABSRESULTS_GEN_ALL_CORE
13.4   12.80 )-----------( 280      ( 17 Oct 2023 13:54 )             40.1     10-17    140  |  101  |  11.9  ----------------------------<  135<H>  4.0   |  26.0  |  0.69    Ca    9.8      17 Oct 2023 13:54  Mg     2.2     10-17    TPro  6.8  /  Alb  4.1  /  TBili  0.6  /  DBili  x   /  AST  21  /  ALT  29  /  AlkPhos  110  10-17

## 2023-10-17 NOTE — ED ADULT NURSE REASSESSMENT NOTE - NS ED NURSE REASSESS COMMENT FT1
Report given to BRITTA Matt.
pt noted to be hyptensice 66/40. MD samuels made aware, at bedside . pt denies dizziness, chest pain or sob. continued cardiac monitoring in place. anox4. fluids hung as per orders
report given to BRITTA Caldera at 17:29. pt moved to b2l. continued cardiac monitoring in place. rr even and unlabored. cardiology at bedside. pt denies dizziness, chest pain or sob. rn made aware of transfer of care. pt educated on plan of care, pt able to successfully teach back plan of care to RN, RN will continue to reeducate pt during hospital stay.
Transport to ED to take pt to Echo. Pt noted have HR 60 and appear to have converted to NSR. This RN attempted to contact hospitalist on call, Dr. Solitario, to notify him of change. VM was left and repeat EKG will be obtained.

## 2023-10-17 NOTE — CONSULT NOTE ADULT - SUBJECTIVE AND OBJECTIVE BOX
PCP: Adia Biggs  GI: Mike Murphy    This is a 78 year old female patient with a history of HTN, HLD, GERD with recent gastric wall thickening on CT scan with lymph nodes, and osteoporosis who presents to the ER with right jaw pain, midsternal chest pain and shortness of breath since this morning at 1230AM. Patient describes chest pain as a pressure, slowly progressive since onset, now constant. Jaw pain has resolved at this time. Denies nausea, vomiting, fever, chills, cough. Reports has had intermittent shortness of breath at rest >1 year, feels that this has increased over the last day. Also notes mild frontal headache. No medications taken for symptoms.    PAST MEDICAL & SURGICAL HISTORY:  HTN  HLD,   GERD with esophagitis    REVIEW OF SYSTEMS  General:	  Skin/Breast:  Ophthalmologic:  ENMT:	  Respiratory and Thorax:  Cardiovascular:	  Gastrointestinal:	  Genitourinary:	  Musculoskeletal:	  Neurological:	  Psychiatric:	  Hematology/Lymphatics:	  Endocrine:	  Allergic/Immunologic:	    MEDICATIONS  (STANDING):  acetaminophen     Tablet .. 650 milliGRAM(s) Oral once  metoprolol tartrate 25 milliGRAM(s) Oral two times a day  sodium chloride 0.9% Bolus 1000 milliLiter(s) IV Bolus once  sodium chloride 0.9% Bolus 1000 milliLiter(s) IV Bolus once    Allergies  No Known Allergies    SOCIAL HISTORY: former smoker; quit 1989. non drinker, no drug use    FAMILY HISTORY: Mother has DM    Vital Signs Last 24 Hrs  T(C): 36.7 (17 Oct 2023 12:54), Max: 36.7 (17 Oct 2023 12:54)  T(F): 98 (17 Oct 2023 12:54), Max: 98 (17 Oct 2023 12:54)  HR: 116 (17 Oct 2023 14:15) (70 - 180)  BP: 97/62 (17 Oct 2023 14:15) (97/62 - 103/60)  RR: 20 (17 Oct 2023 14:15) (20 - 20)  SpO2: 98% (17 Oct 2023 14:15) (98% - 98%)    Physical Exam:  Constitutional: AAOx3, NAD  Neck: supple, No JVD  Cardiovascular: +S1S2 RRR, no murmurs, rubs, gallops   Pulmonary: CTA b/l, unlabored, no wheezes, rales. rhonci  Abdomen: +BS, soft NTND  Extremities: no edema b/l, +distal pulses b/l  Neuro: non focal, speech clear, GANDHI x 4    LABS:                        13.4   12.80 )-----------( 280      ( 17 Oct 2023 13:54 )             40.1     140  |  101  |  11.9  ----------------------------<  135<H>  4.0   |  26.0  |  0.69  Ca    9.8      17 Oct 2023 13:54  Mg     2.2     10-17  TPro  6.8  /  Alb  4.1  /  TBili  0.6  /  DBili  x   /  AST  21  /  ALT  29  /  AlkPhos  110  10-17  LIVER FUNCTIONS - ( 17 Oct 2023 13:54 )  Alb: 4.1 g/dL / Pro: 6.8 g/dL / ALK PHOS: 110 U/L / ALT: 29 U/L / AST: 21 U/L / GGT: x         CARDIAC MARKERS ( 17 Oct 2023 13:54 )  x     / <0.01 ng/mL / x     / x     / x        RADIOLOGY & ADDITIONAL STUDIES:  [facility] 5/9/2023  Asymmetric wall thickening along the medial aspect of the fundus of the stomach. There are also mildly enlarged lymph nodes anterior to the distal esophagus and in the gastrohepatic ligament. Small hiatal hernia.  PCP: Adia Biggs  GI: Mike Murphy    Maldivian interpretation provided by Orlando CHRISTINA     This is a 78 year old female patient with a history of HTN, HLD, GERD with recent gastric wall thickening on CT scan with lymph nodes, urinary frequency and osteoporosis who presents to the ER with sudden onset mid sternal non radiating chest pain which began this morning between 12:30 and 1AM. Pain was unrelenting in nature and persistent and did not subside till she arrived in the ER. She endorses a history of dyspnea on exertion, dry cough, and intermittent palpitations lasting up to 15 minutes in duration for at least one years time. She reports that she was planning on seeing a cardiologist at [facility] at the end of the month to discuss some of these symptoms. She otherwise denies any recent fever chill or ill contacts. She denies any history of syncope or bleeding events. In the past she has tried to use herbal medications or chewing on garlic to ease her symptoms.     PAST MEDICAL & SURGICAL HISTORY:  HTN  HLD,   GERD with esophagitis    REVIEW OF SYSTEMS  General: - fever chills, +exertional dyspnea  Skin/Breast: - rashes  Ophthalmologic: - blurred vision  ENMT: - sore throat  Respiratory and Thorax: + dyspnea, + non productive cough  Cardiovascular: + SAMAYOA, + chest pain, + palpitations,   Gastrointestinal: + nausea, - vomiting   Genitourinary: + urinary frequency  Musculoskeletal:	 - joint pain   Neurological: - weaknesses  Psychiatric: - anxiety   Hematology/Lymphatics:	 - bleeding disorders  Endocrine: + hx of DM in past    MEDICATIONS Home  Alendronate 70mg QD  Norvasc 5mg QD  Atorvastatin 20mg QHS  Pepcid 20mg QD  HCTZ 12.5mg QD  Mirabegron ER 50mg QD  Calcium 500mg QD    Allergies  No Known Allergies    SOCIAL HISTORY: former smoker; quit 1989. non drinker, no drug use    FAMILY HISTORY: Mother has DM    Vital Signs Last 24 Hrs  T(C): 36.7 (17 Oct 2023 12:54), Max: 36.7 (17 Oct 2023 12:54)  T(F): 98 (17 Oct 2023 12:54), Max: 98 (17 Oct 2023 12:54)  HR: 116 (17 Oct 2023 14:15) (70 - 180)  BP: 97/62 (17 Oct 2023 14:15) (97/62 - 103/60)  RR: 20 (17 Oct 2023 14:15) (20 - 20)  SpO2: 98% (17 Oct 2023 14:15) (98% - 98%)    Physical Exam:  Constitutional: AAOx3, NAD, slight in build  Neck: supple, No JVD  Cardiovascular: +S1S2 IRIR; rapid AFib at time of exam; no murmurs appreciated   Pulmonary: Crackles at bilateral bases  Abdomen: +BS, mild epigastric tenderness to deep palpation  Extremities: no edema b/l,   Neuro: non focal, speech clear, GANDHI x 4  Psych: appropriate mood and affect.     LABS:                        13.4   12.80 )-----------( 280      ( 17 Oct 2023 13:54 )             40.1     140  |  101  |  11.9  ----------------------------<  135<H>  4.0   |  26.0  |  0.69  Ca    9.8      17 Oct 2023 13:54  Mg     2.2     10-17  TPro  6.8  /  Alb  4.1  /  TBili  0.6  /  DBili  x   /  AST  21  /  ALT  29  /  AlkPhos  110  10-17  LIVER FUNCTIONS - ( 17 Oct 2023 13:54 )  Alb: 4.1 g/dL / Pro: 6.8 g/dL / ALK PHOS: 110 U/L / ALT: 29 U/L / AST: 21 U/L / GGT: x         CARDIAC MARKERS ( 17 Oct 2023 13:54 )  x     / <0.01 ng/mL / x     / x     / x        RADIOLOGY & ADDITIONAL STUDIES:  [facility] 5/9/2023  Asymmetric wall thickening along the medial aspect of the fundus of the stomach. There are also mildly enlarged lymph nodes anterior to the distal esophagus and in the gastrohepatic ligament. Small hiatal hernia.     A/P  78 year old female patient with a history of HTN, HLD, GERD with recent gastric wall thickening on CT scan with lymph nodes, urinary frequency and osteoporosis who is admitted with chest pain and newly diagnosed atrial fibrillation with rapid rates.     CHADSVASC: 4    - Cardiology note appreciated - consider ischemic work up   - TSH normal  - TTE with DEFINITY ordered  - Rate control started for now with Lopressor. May use small IV pushes overnight for breakthrough events  - Would start anticoagulation. Lovenox is acceptable. Will require DOAC upon discharge  - Would keep NPO post midnight in anticipation of HUMAIRA guided DCCV tomorrow  - would check LFTs, A1C  - Full recommendations to follow below.

## 2023-10-17 NOTE — ED PROVIDER NOTE - PHYSICAL EXAMINATION
Gen: well appearing, no acute distress  Head: normocephalic, atraumatic  EENT: EOMI, moist mucous membranes, no scleral icterus, no JVD  Lung: no increased work of breathing, clear to auscultation bilaterally, no wheezing, rales, rhonchi, speaking in full sentences  CV: tachycardic rate, irregular rhythm, normal s1/s2, 2+ radial pulses bilaterally  Abd: soft, non-tender, non-distended, no rebound tenderness or guarding; no CVA tenderness  MSK: No edema, no visible deformities, full range of motion in all 4 extremities  Neuro: Awake, alert, no focal neurologic deficits  Skin: No obvious rash, no jaundice  Psych: normal affect, normal speech

## 2023-10-17 NOTE — ED PROVIDER NOTE - CLINICAL SUMMARY MEDICAL DECISION MAKING FREE TEXT BOX
78 year old female presenting with right jaw pain, midsternal chest pressure, shortness of breath at rest beginning about 1230 last night. Placed on cardiac monitor. Afib RVR 140s-160s initially, given cardizem 12mg IVP with improvement, HR in 90s-110s. Given oral dose. Labs sent. 78 year old female presenting with right jaw pain, midsternal chest pressure, shortness of breath at rest beginning about 1230 last night. Placed on cardiac monitor. Afib RVR 140s-160s initially, given cardizem 13mg IVP with improvement, HR in 90s-110s. Given oral dose. Labs sent.    Labs with no acute findings.    Pt's HR between 90s-130s, soft BP. Cardiology and EP evaluated patient. Recs appreciated. Admit to step down.

## 2023-10-17 NOTE — CONSULT NOTE ADULT - NS ATTEND AMEND GEN_ALL_CORE FT
Patient presenting with new onset afib rvr, symptomatic. Rate control for now. Continue lovenox for AC, will need NOAC at discharge. Plan for HUMAIRA/DCCV tomorrow.
Patient seen and examined by me. Seen in presence of: daughter. 78y old  Female PMH HTN, HLD, DM, osteoporosis. Presents with right jaw pain, headache followed by midsternal chest pain pressure / tightness accompanied with SOB with radiation towards posterior back. Found to be in new onset Atrial fibrillation with RVR    New onset atrial fibrillation with RVR  hypertension  hyperlipidemia  diabetes mellitus type 2  osteoporosis    T(C): 36.7 (10-17-23 @ 12:54), Max: 36.7 (10-17-23 @ 12:54)  HR: 116 (10-17-23 @ 14:15) (70 - 180)  BP: 97/62 (10-17-23 @ 14:15) (97/62 - 103/60)  RR: 20 (10-17-23 @ 14:15) (20 - 20)  SpO2: 98% (10-17-23 @ 14:15) (98% - 98%)  Patient alert and awake.  Chest: Bilateral Clear BS  Cardiac: S1 and S2  Abdomen: Soft  Lower extremity: no edema      Pleasant patient who presents with her daughter. She has chest pain of acute nature. New onset atrial fibrillation with RVR. Does not feel the palpitations but feels chest tightness which has improved with IV fluid hydration and rate control. Hypotensive and fluid responsive. TSH WNL.    Personally completed a bedside POCUS which reveals Hyperdynamic LV EF, no obvious pericardial effusion, collapsible IVC  CHADSVASC elevated and she will require DOAC on discharge.    Recommend IV fluid hydration  2D TTE  Rate control with PO and IV lopressor  initiate on heparin infusion for Afib. No bleeding recent/risk factors per subjective history. Patient in agreement to start anticoagulation.  EP consultation for HUMAIRA/DCCV/ILR evaluation  Keep NPO at midnight accordingly.  cycle troponins  routine add on cardiac preventive labs.    I did tell the daughter and the patient they are to remain in the hospital. They agree with the plan.    Patient was seen and evaluated. Available pertinent records, imaging reports and lab results were reviewed by the Heart team and mentioned as appropriate. Plan of care was discussed with the patient and any available family members (with patient consent), with questions answered and treatment plan agreement. We have informed the primary team of our treatment plan. The patient is aware of any side effects of new medications initiated, risks/benefits/alternatives of procedures/imaging planned.    Please include a copy of lifestyle cardiovascular instruction on discharge which I have personally outline to the patient below. The following testing and/or interventions are recommended.  CARDIOVASCULAR LIFESTYLE INSTRUCTIONS:  Advised the patient lifestyle modifications including a plant based, limited salt, low fat, no to minimal dairy diet, stress reduction/psychosomatic management, sleep hygiene/ELIAS testing and healthy weight loss, annual influenza vaccine, medication compliance, high risk behavior mitigation (I.e. tobacco abstinence, alcohol abstinence, recreational/illicit drug use abstinence), increased exercise activity as per AHA/ACC standard for long term cardiovascular risk reduction.    I have discussed my recommendation with the ACP which are outlined above. Thank you for allowing me to participate in the care of this patient.

## 2023-10-17 NOTE — ED PROVIDER NOTE - ATTENDING CONTRIBUTION TO CARE
78-year-old female; PMH significant for HTN, HLD; now presenting with right jaw pain rating to the midsternal chest, associated with shortness of breath and palpitations.  Patient reports she awoke from sleep with this chest pressure and palpitations with dyspnea on exertion.  Denies any nausea or vomiting.  Denies any diaphoresis.  Denies any recent travel.  Denies any recent trauma.  Denies smoking.  General:     NAD  Head:     NC/AT, EOMI, oral mucosa moist  Neck:     trachea midline  Lungs:     CTA b/l, no w/r/r  CVS:  tachycardic, irregular  Abd:     +BS, s/nt/nd, no organomegaly  Ext:    2+ radial and pedal pulses, no c/c/e  Neuro: AAOx3, no sensory/motor deficits  A/P:  78yoF p/w chest pain and palpitations with sob.  found to have new onset rapid afib. given rate control. cardiology consulted. will admit for further eval.

## 2023-10-17 NOTE — ED PROVIDER NOTE - OBJECTIVE STATEMENT
woke up right jaw pain, developd L chest pain, sob 78 year old female with PMHx HTN, HLD, osteoporosis presenting for evaluation of right jaw pain, midsternal chest pain and shortness of breath since this morning at 1230AM. Patient describes chest pain as a pressure, slowly progressive since onset, now constant. Denies nausea, vomiting, fever, chills, cough. Reports has had >1 year shortness of breath at rest 78 year old female with PMHx HTN, HLD, osteoporosis presenting for evaluation of right jaw pain, midsternal chest pain and shortness of breath since this morning at 1230AM. Patient describes chest pain as a pressure, slowly progressive since onset, now constant. Jaw pain has resolved at this time. Denies nausea, vomiting, fever, chills, cough. Reports has had intermittent shortness of breath at rest >1 year, feels that this has increased over the last day. Also notes mild frontal headache. No medications taken for symptoms.

## 2023-10-17 NOTE — ED ADULT NURSE NOTE - NSFALLUNIVINTERV_ED_ALL_ED
Bed/Stretcher in lowest position, wheels locked, appropriate side rails in place/Call bell, personal items and telephone in reach/Instruct patient to call for assistance before getting out of bed/chair/stretcher/Non-slip footwear applied when patient is off stretcher/[place] to call system/Physically safe environment - no spills, clutter or unnecessary equipment/Purposeful proactive rounding/Room/bathroom lighting operational, light cord in reach

## 2023-10-17 NOTE — CONSULT NOTE ADULT - ASSESSMENT
78y old  Female PMH HTN, HLD, DM, osteoporosis. Presents with right jaw pain, headache followed by midsternal chest pain pressure / tightness accompanied with SOB with radiation towards posterior back.  Never had prior episodes. Has been ongoing since 1am this morning and when she came to the ED it all resolved. S/p IV / PO cardizem but remain hypotensive. S/p IV bolus 2L.  Off note has been experiencing dyspnea on exertion on and off for the past year. Episodes last 15 mins approximately and resolve with rest, she states "she cant catch her breath". Has familial history of MI.

## 2023-10-17 NOTE — H&P ADULT - ASSESSMENT
79yo female with hx of HTN, HLD, DM, osteoporosis and GERD with recent gastric wall thickening on CT scan (done as outpatient) who presents to the hospital after experiencing sudden chest pain, shortness of breath and palpitations that awoke her from sleep around 1AM today. Found to be in afib with RVR. She was given diltiazem 13mg IVP followed by 30mg PO with partial response. BP on the soft side. Labs with leukocytosis but negative troponin x1. Cardio and EP consulted. Pt admitted for further management.    Afib with RVR  - admit to SDU  - cardiac monitoring  - Cardio and EP following  - start lopressor 25mg BID  - should BP fall <90 systolic will recall EP, likely will need digoxin vs amio  - trend cardiac enzymes  - NPO at midnight for possible cardioversion tomorrow  - resume statin  - start therapeutic lovenox (50mg q12hr)  - TTE  -  79yo female with hx of HTN, HLD, DM, osteoporosis and GERD with recent gastric wall thickening on CT scan (done as outpatient) who presents to the hospital after experiencing sudden chest pain, shortness of breath and palpitations that awoke her from sleep around 1AM today. Found to be in afib with RVR. She was given diltiazem 13mg IVP followed by 30mg PO with partial response. BP on the soft side. Labs with leukocytosis but negative troponin x1. Cardio and EP consulted. Pt admitted for further management.    Afib with RVR  - admit to SDU  - cardiac monitoring  - Cardio and EP following  - start lopressor 25mg BID  - should BP fall <90 systolic will recall EP, likely will need digoxin vs amio  - trend cardiac enzymes  - NPO at midnight for possible cardioversion tomorrow  - resume statin  - start therapeutic lovenox (50mg q12hr)  - TTE  - check LFTs, a1c  - monitor electrolytes    Hx of HTN/HLD  - hold home BP meds for now  - c/w lopressor as tolerated  - resume statin    Hx of osteoporosis  - hold alendronate at this time  - resume after stabilization and if cardiac procedure done    Hx of GERD  - resume pepcid daily    DVT ppx: lovenox  Dispo: SDU   77yo female with hx of HTN, HLD, DM, osteoporosis and GERD with recent gastric wall thickening on CT scan (done as outpatient) who presents to the hospital after experiencing sudden chest pain, shortness of breath and palpitations that awoke her from sleep around 1AM today. Found to be in afib with RVR. She was given diltiazem 13mg IVP followed by 30mg PO with partial response. BP on the soft side. Labs with leukocytosis but negative troponin x1. Cardio and EP consulted. Pt admitted for further management.    Afib with RVR  - admit to SDU  - cardiac monitoring  - Cardio and EP following  - start lopressor 25mg BID  - recall cardio/EP should BP fall <90 systolic and pt requires further rate control, likely will need digoxin vs amio  - start therapeutic lovenox  - resume statin  - trend cardiac enzymes  - NPO at midnight for possible cardioversion tomorrow  - TTE  - check LFTs, a1c  - monitor electrolytes    Hx of HTN/HLD  - hold home BP meds for now  - c/w lopressor as tolerated  - resume statin    Hx of osteoporosis  - hold alendronate at this time  - resume after cardiac procedure, if done    Hx of GERD  - resume pepcid daily    DVT ppx: lovenox  Dispo: SDU

## 2023-10-18 ENCOUNTER — TRANSCRIPTION ENCOUNTER (OUTPATIENT)
Age: 78
End: 2023-10-18

## 2023-10-18 VITALS
TEMPERATURE: 98 F | RESPIRATION RATE: 18 BRPM | DIASTOLIC BLOOD PRESSURE: 59 MMHG | HEART RATE: 64 BPM | OXYGEN SATURATION: 98 % | SYSTOLIC BLOOD PRESSURE: 118 MMHG

## 2023-10-18 LAB
A1C WITH ESTIMATED AVERAGE GLUCOSE RESULT: 6.6 % — HIGH (ref 4–5.6)
A1C WITH ESTIMATED AVERAGE GLUCOSE RESULT: 6.6 % — HIGH (ref 4–5.6)
ALBUMIN SERPL ELPH-MCNC: 3.4 G/DL — SIGNIFICANT CHANGE UP (ref 3.3–5.2)
ALBUMIN SERPL ELPH-MCNC: 3.4 G/DL — SIGNIFICANT CHANGE UP (ref 3.3–5.2)
ALP SERPL-CCNC: 93 U/L — SIGNIFICANT CHANGE UP (ref 40–120)
ALP SERPL-CCNC: 93 U/L — SIGNIFICANT CHANGE UP (ref 40–120)
ALT FLD-CCNC: 24 U/L — SIGNIFICANT CHANGE UP
ALT FLD-CCNC: 24 U/L — SIGNIFICANT CHANGE UP
ANION GAP SERPL CALC-SCNC: 11 MMOL/L — SIGNIFICANT CHANGE UP (ref 5–17)
ANION GAP SERPL CALC-SCNC: 11 MMOL/L — SIGNIFICANT CHANGE UP (ref 5–17)
APPEARANCE UR: CLEAR — SIGNIFICANT CHANGE UP
APPEARANCE UR: CLEAR — SIGNIFICANT CHANGE UP
AST SERPL-CCNC: 17 U/L — SIGNIFICANT CHANGE UP
AST SERPL-CCNC: 17 U/L — SIGNIFICANT CHANGE UP
BACTERIA # UR AUTO: ABNORMAL
BACTERIA # UR AUTO: ABNORMAL
BASOPHILS # BLD AUTO: 0.03 K/UL — SIGNIFICANT CHANGE UP (ref 0–0.2)
BASOPHILS # BLD AUTO: 0.03 K/UL — SIGNIFICANT CHANGE UP (ref 0–0.2)
BASOPHILS NFR BLD AUTO: 0.3 % — SIGNIFICANT CHANGE UP (ref 0–2)
BASOPHILS NFR BLD AUTO: 0.3 % — SIGNIFICANT CHANGE UP (ref 0–2)
BILIRUB DIRECT SERPL-MCNC: 0.2 MG/DL — SIGNIFICANT CHANGE UP (ref 0–0.3)
BILIRUB DIRECT SERPL-MCNC: 0.2 MG/DL — SIGNIFICANT CHANGE UP (ref 0–0.3)
BILIRUB INDIRECT FLD-MCNC: 0.7 MG/DL — SIGNIFICANT CHANGE UP (ref 0.2–1)
BILIRUB INDIRECT FLD-MCNC: 0.7 MG/DL — SIGNIFICANT CHANGE UP (ref 0.2–1)
BILIRUB SERPL-MCNC: 0.9 MG/DL — SIGNIFICANT CHANGE UP (ref 0.4–2)
BILIRUB SERPL-MCNC: 0.9 MG/DL — SIGNIFICANT CHANGE UP (ref 0.4–2)
BILIRUB UR-MCNC: NEGATIVE — SIGNIFICANT CHANGE UP
BILIRUB UR-MCNC: NEGATIVE — SIGNIFICANT CHANGE UP
BUN SERPL-MCNC: 6.2 MG/DL — LOW (ref 8–20)
BUN SERPL-MCNC: 6.2 MG/DL — LOW (ref 8–20)
CALCIUM SERPL-MCNC: 8 MG/DL — LOW (ref 8.4–10.5)
CALCIUM SERPL-MCNC: 8 MG/DL — LOW (ref 8.4–10.5)
CHLORIDE SERPL-SCNC: 107 MMOL/L — SIGNIFICANT CHANGE UP (ref 96–108)
CHLORIDE SERPL-SCNC: 107 MMOL/L — SIGNIFICANT CHANGE UP (ref 96–108)
CO2 SERPL-SCNC: 24 MMOL/L — SIGNIFICANT CHANGE UP (ref 22–29)
CO2 SERPL-SCNC: 24 MMOL/L — SIGNIFICANT CHANGE UP (ref 22–29)
COLOR SPEC: YELLOW — SIGNIFICANT CHANGE UP
COLOR SPEC: YELLOW — SIGNIFICANT CHANGE UP
CREAT SERPL-MCNC: 0.48 MG/DL — LOW (ref 0.5–1.3)
CREAT SERPL-MCNC: 0.48 MG/DL — LOW (ref 0.5–1.3)
DIFF PNL FLD: NEGATIVE — SIGNIFICANT CHANGE UP
DIFF PNL FLD: NEGATIVE — SIGNIFICANT CHANGE UP
EGFR: 98 ML/MIN/1.73M2 — SIGNIFICANT CHANGE UP
EGFR: 98 ML/MIN/1.73M2 — SIGNIFICANT CHANGE UP
EOSINOPHIL # BLD AUTO: 0.06 K/UL — SIGNIFICANT CHANGE UP (ref 0–0.5)
EOSINOPHIL # BLD AUTO: 0.06 K/UL — SIGNIFICANT CHANGE UP (ref 0–0.5)
EOSINOPHIL NFR BLD AUTO: 0.7 % — SIGNIFICANT CHANGE UP (ref 0–6)
EOSINOPHIL NFR BLD AUTO: 0.7 % — SIGNIFICANT CHANGE UP (ref 0–6)
EPI CELLS # UR: SIGNIFICANT CHANGE UP
EPI CELLS # UR: SIGNIFICANT CHANGE UP
ESTIMATED AVERAGE GLUCOSE: 143 MG/DL — HIGH (ref 68–114)
ESTIMATED AVERAGE GLUCOSE: 143 MG/DL — HIGH (ref 68–114)
GLUCOSE SERPL-MCNC: 98 MG/DL — SIGNIFICANT CHANGE UP (ref 70–99)
GLUCOSE SERPL-MCNC: 98 MG/DL — SIGNIFICANT CHANGE UP (ref 70–99)
GLUCOSE UR QL: NEGATIVE MG/DL — SIGNIFICANT CHANGE UP
GLUCOSE UR QL: NEGATIVE MG/DL — SIGNIFICANT CHANGE UP
HCT VFR BLD CALC: 38.6 % — SIGNIFICANT CHANGE UP (ref 34.5–45)
HCT VFR BLD CALC: 38.6 % — SIGNIFICANT CHANGE UP (ref 34.5–45)
HCV AB S/CO SERPL IA: 0.1 S/CO — SIGNIFICANT CHANGE UP (ref 0–0.99)
HCV AB S/CO SERPL IA: 0.1 S/CO — SIGNIFICANT CHANGE UP (ref 0–0.99)
HCV AB SERPL-IMP: SIGNIFICANT CHANGE UP
HCV AB SERPL-IMP: SIGNIFICANT CHANGE UP
HGB BLD-MCNC: 12.3 G/DL — SIGNIFICANT CHANGE UP (ref 11.5–15.5)
HGB BLD-MCNC: 12.3 G/DL — SIGNIFICANT CHANGE UP (ref 11.5–15.5)
IMM GRANULOCYTES NFR BLD AUTO: 0.5 % — SIGNIFICANT CHANGE UP (ref 0–0.9)
IMM GRANULOCYTES NFR BLD AUTO: 0.5 % — SIGNIFICANT CHANGE UP (ref 0–0.9)
KETONES UR-MCNC: NEGATIVE — SIGNIFICANT CHANGE UP
KETONES UR-MCNC: NEGATIVE — SIGNIFICANT CHANGE UP
LEUKOCYTE ESTERASE UR-ACNC: NEGATIVE — SIGNIFICANT CHANGE UP
LEUKOCYTE ESTERASE UR-ACNC: NEGATIVE — SIGNIFICANT CHANGE UP
LYMPHOCYTES # BLD AUTO: 2.68 K/UL — SIGNIFICANT CHANGE UP (ref 1–3.3)
LYMPHOCYTES # BLD AUTO: 2.68 K/UL — SIGNIFICANT CHANGE UP (ref 1–3.3)
LYMPHOCYTES # BLD AUTO: 30.9 % — SIGNIFICANT CHANGE UP (ref 13–44)
LYMPHOCYTES # BLD AUTO: 30.9 % — SIGNIFICANT CHANGE UP (ref 13–44)
MAGNESIUM SERPL-MCNC: 2.2 MG/DL — SIGNIFICANT CHANGE UP (ref 1.6–2.6)
MAGNESIUM SERPL-MCNC: 2.2 MG/DL — SIGNIFICANT CHANGE UP (ref 1.6–2.6)
MCHC RBC-ENTMCNC: 27.7 PG — SIGNIFICANT CHANGE UP (ref 27–34)
MCHC RBC-ENTMCNC: 27.7 PG — SIGNIFICANT CHANGE UP (ref 27–34)
MCHC RBC-ENTMCNC: 31.9 GM/DL — LOW (ref 32–36)
MCHC RBC-ENTMCNC: 31.9 GM/DL — LOW (ref 32–36)
MCV RBC AUTO: 86.9 FL — SIGNIFICANT CHANGE UP (ref 80–100)
MCV RBC AUTO: 86.9 FL — SIGNIFICANT CHANGE UP (ref 80–100)
MONOCYTES # BLD AUTO: 0.98 K/UL — HIGH (ref 0–0.9)
MONOCYTES # BLD AUTO: 0.98 K/UL — HIGH (ref 0–0.9)
MONOCYTES NFR BLD AUTO: 11.3 % — SIGNIFICANT CHANGE UP (ref 2–14)
MONOCYTES NFR BLD AUTO: 11.3 % — SIGNIFICANT CHANGE UP (ref 2–14)
NEUTROPHILS # BLD AUTO: 4.87 K/UL — SIGNIFICANT CHANGE UP (ref 1.8–7.4)
NEUTROPHILS # BLD AUTO: 4.87 K/UL — SIGNIFICANT CHANGE UP (ref 1.8–7.4)
NEUTROPHILS NFR BLD AUTO: 56.3 % — SIGNIFICANT CHANGE UP (ref 43–77)
NEUTROPHILS NFR BLD AUTO: 56.3 % — SIGNIFICANT CHANGE UP (ref 43–77)
NITRITE UR-MCNC: NEGATIVE — SIGNIFICANT CHANGE UP
NITRITE UR-MCNC: NEGATIVE — SIGNIFICANT CHANGE UP
PH UR: 7 — SIGNIFICANT CHANGE UP (ref 5–8)
PH UR: 7 — SIGNIFICANT CHANGE UP (ref 5–8)
PLATELET # BLD AUTO: 241 K/UL — SIGNIFICANT CHANGE UP (ref 150–400)
PLATELET # BLD AUTO: 241 K/UL — SIGNIFICANT CHANGE UP (ref 150–400)
POTASSIUM SERPL-MCNC: 3.5 MMOL/L — SIGNIFICANT CHANGE UP (ref 3.5–5.3)
POTASSIUM SERPL-MCNC: 3.5 MMOL/L — SIGNIFICANT CHANGE UP (ref 3.5–5.3)
POTASSIUM SERPL-SCNC: 3.5 MMOL/L — SIGNIFICANT CHANGE UP (ref 3.5–5.3)
POTASSIUM SERPL-SCNC: 3.5 MMOL/L — SIGNIFICANT CHANGE UP (ref 3.5–5.3)
PROT SERPL-MCNC: 6 G/DL — LOW (ref 6.6–8.7)
PROT SERPL-MCNC: 6 G/DL — LOW (ref 6.6–8.7)
PROT UR-MCNC: NEGATIVE — SIGNIFICANT CHANGE UP
PROT UR-MCNC: NEGATIVE — SIGNIFICANT CHANGE UP
RBC # BLD: 4.44 M/UL — SIGNIFICANT CHANGE UP (ref 3.8–5.2)
RBC # BLD: 4.44 M/UL — SIGNIFICANT CHANGE UP (ref 3.8–5.2)
RBC # FLD: 14.6 % — HIGH (ref 10.3–14.5)
RBC # FLD: 14.6 % — HIGH (ref 10.3–14.5)
RBC CASTS # UR COMP ASSIST: SIGNIFICANT CHANGE UP /HPF (ref 0–4)
RBC CASTS # UR COMP ASSIST: SIGNIFICANT CHANGE UP /HPF (ref 0–4)
SODIUM SERPL-SCNC: 142 MMOL/L — SIGNIFICANT CHANGE UP (ref 135–145)
SODIUM SERPL-SCNC: 142 MMOL/L — SIGNIFICANT CHANGE UP (ref 135–145)
SP GR SPEC: 1 — LOW (ref 1.01–1.02)
SP GR SPEC: 1 — LOW (ref 1.01–1.02)
UROBILINOGEN FLD QL: NEGATIVE MG/DL — SIGNIFICANT CHANGE UP
UROBILINOGEN FLD QL: NEGATIVE MG/DL — SIGNIFICANT CHANGE UP
WBC # BLD: 8.66 K/UL — SIGNIFICANT CHANGE UP (ref 3.8–10.5)
WBC # BLD: 8.66 K/UL — SIGNIFICANT CHANGE UP (ref 3.8–10.5)
WBC # FLD AUTO: 8.66 K/UL — SIGNIFICANT CHANGE UP (ref 3.8–10.5)
WBC # FLD AUTO: 8.66 K/UL — SIGNIFICANT CHANGE UP (ref 3.8–10.5)
WBC UR QL: SIGNIFICANT CHANGE UP /HPF (ref 0–5)
WBC UR QL: SIGNIFICANT CHANGE UP /HPF (ref 0–5)

## 2023-10-18 PROCEDURE — 99239 HOSP IP/OBS DSCHRG MGMT >30: CPT

## 2023-10-18 RX ORDER — INFLUENZA VIRUS VACCINE 15; 15; 15; 15 UG/.5ML; UG/.5ML; UG/.5ML; UG/.5ML
0.7 SUSPENSION INTRAMUSCULAR ONCE
Refills: 0 | Status: DISCONTINUED | OUTPATIENT
Start: 2023-10-18 | End: 2023-10-18

## 2023-10-18 RX ORDER — AMLODIPINE BESYLATE 2.5 MG/1
1 TABLET ORAL
Refills: 0 | DISCHARGE

## 2023-10-18 RX ORDER — APIXABAN 2.5 MG/1
1 TABLET, FILM COATED ORAL
Qty: 60 | Refills: 0
Start: 2023-10-18 | End: 2023-11-16

## 2023-10-18 RX ORDER — APIXABAN 2.5 MG/1
5 TABLET, FILM COATED ORAL
Refills: 0 | Status: DISCONTINUED | OUTPATIENT
Start: 2023-10-18 | End: 2023-10-18

## 2023-10-18 RX ORDER — METOPROLOL TARTRATE 50 MG
1 TABLET ORAL
Qty: 60 | Refills: 0
Start: 2023-10-18 | End: 2023-11-16

## 2023-10-18 RX ADMIN — ENOXAPARIN SODIUM 50 MILLIGRAM(S): 100 INJECTION SUBCUTANEOUS at 05:23

## 2023-10-18 NOTE — DISCHARGE NOTE PROVIDER - HOSPITAL COURSE
79yo female with hx of HTN, HLD, DM, osteoporosis and GERD with recent gastric wall thickening on CT scan (done as outpatient) who presents to the hospital after experiencing sudden chest pain, shortness of breath and palpitations that awoke her from sleep on early morning of 10/17. Pt was found to be in afib with RVR and treated with IV and PO medications with some improvement in HR initially but persistent tachycardia/afib. 79yo female with hx of HTN, HLD, DM, osteoporosis and GERD with recent gastric wall thickening on CT scan (done as outpatient) who presents to the hospital after experiencing sudden chest pain, shortness of breath and palpitations that awoke her from sleep on early morning of 10/17. Pt was found to be in afib with RVR and treated with IV and PO medications with some improvement in HR initially but persistent tachycardia/afib. EP and cardiology consulted. Recommended beta blockers and were considering cardioversion if remains in afib. Next day pt noted to have converted to sinus rhythm with stable HR. EP/cardio agree no need for cardioversion at this time. Pt should continue beta blocker and follow up with cardiologist. Referral sent. Pt hemodynamically and medically optimized for discharge home today.

## 2023-10-18 NOTE — PROGRESS NOTE ADULT - SUBJECTIVE AND OBJECTIVE BOX
Patient awoken, seen and examined today. Overnight telemetry reviewed.     TELE: SR with rates in the 60-70s. Likely converted while being moved from B side to CDU last evening. Telemetry incomplete     MEDICATIONS  (STANDING):  atorvastatin 20 milliGRAM(s) Oral at bedtime  enoxaparin Injectable 50 milliGRAM(s) SubCutaneous every 12 hours  famotidine    Tablet 20 milliGRAM(s) Oral daily  influenza  Vaccine (HIGH DOSE) 0.7 milliLiter(s) IntraMuscular once  metoprolol tartrate 25 milliGRAM(s) Oral two times a day    MEDICATIONS  (PRN):  acetaminophen     Tablet .. 650 milliGRAM(s) Oral every 6 hours PRN Temp greater or equal to 38C (100.4F), Mild Pain (1 - 3)  aluminum hydroxide/magnesium hydroxide/simethicone Suspension 30 milliLiter(s) Oral every 4 hours PRN Dyspepsia  melatonin 3 milliGRAM(s) Oral at bedtime PRN Insomnia  ondansetron Injectable 4 milliGRAM(s) IV Push every 8 hours PRN Nausea and/or Vomiting    Allergies  No Known Allergies    PAST MEDICAL & SURGICAL HISTORY:    Vital Signs Last 24 Hrs  T(C): 36.7 (18 Oct 2023 07:44), Max: 36.9 (18 Oct 2023 02:57)  T(F): 98 (18 Oct 2023 07:44), Max: 98.5 (18 Oct 2023 02:57)  HR: 65 (18 Oct 2023 07:44) (60 - 180)  BP: 110/67 (18 Oct 2023 07:44) (92/55 - 110/67)  BP(mean): 75 (17 Oct 2023 23:11) (65 - 75)  RR: 18 (18 Oct 2023 07:44) (16 - 20)  SpO2: 95% (18 Oct 2023 07:44) (94% - 98%)    Physical Exam:  Constitutional: NAD, AAOx3  Cardiovascular: +S1S2 RRR; SR at time of exam  Pulmonary: Mostly CTA b/l, unlabored  GI: soft NTND +BS  Extremities: no pedal edema  Neuro: non focal, GANDHI x4    LABS:                        12.3   8.66  )-----------( 241      ( 18 Oct 2023 04:21 )             38.6     142  |  107  |  6.2<L>  ----------------------------<  98  3.5   |  24.0  |  0.48<L>  Ca    8.0<L>      18 Oct 2023 04:21  Mg     2.2     10-18  TPro  6.0<L>  /  Alb  3.4  /  TBili  0.9  /  DBili  0.2  /  AST  17  /  ALT  24  /  AlkPhos  93  10-18  PTT - ( 17 Oct 2023 17:06 )  PTT:37.7 sec    RADIOLOGY & ADDITIONAL TESTS:  TTE 10/17/2023  Summary:   1. Normal left ventricular internal cavity size.   2. Normal global left ventricular systolic function.   3. Left ventricular ejection fraction, by visual estimation, is 60 to 65%.   4. Spectral Doppler shows pseudonormal pattern of left ventricular myocardial filling (Grade II diastolic dysfunction).   5. Normal right ventricular size and function.   6. Mildly enlarged left atrium.   7. The right atrium is normal in size.   8. Sclerotic aortic valve with normal opening.   9. Mild thickening of the anterior and posterior mitral valve leaflets.  10. Mild mitral annular calcification.  11. Trace mitral valve regurgitation.  12. There is no evidence of pericardial effusion.    A/P  78 year old female patient with a history of HTN, HLD, GERD with recent gastric wall thickening on CT scan with lymph nodes, urinary frequency and osteoporosis who is admitted with chest pain and newly diagnosed atrial fibrillation with rapid rates.     CHADSVASC: 4 now 5 given DM  Converted to SR at some point last evening. Has remained in SR since  HUMAIRA/DCCV cancelled as a result - patient may eat.     - Would start DOAC at this time  - A1C elevated   - Continue Lopressor 25mg PO BID  - Should seek follow up with Cardiology as an outpatient. May consider outpatient ischemic evaluation.   - No obstruction to discharge planning home today from EP perspective  - EP will sign off

## 2023-10-18 NOTE — DISCHARGE NOTE PROVIDER - ATTENDING DISCHARGE PHYSICAL EXAMINATION:
T(C): 36.6 (10-18-23 @ 11:36), Max: 36.9 (10-18-23 @ 02:57)  HR: 64 (10-18-23 @ 11:36) (60 - 180)  BP: 118/59 (10-18-23 @ 11:36) (92/55 - 118/59)  RR: 18 (10-18-23 @ 11:36) (16 - 20)  SpO2: 98% (10-18-23 @ 11:36) (94% - 98%)    CONSTITUTIONAL: no apparent distress  EYES: PERRLA and symmetric, EOMI, No conjunctival or scleral injection, non-icteric  ENMT: NECK: Supple, symmetric and without tracheal deviation   RESP: No respiratory distress, no use of accessory muscles; CTA b/l, no WRR  CV: RRR, +S1S2, no MRG; no JVD; no peripheral edema  GI: Soft, NT, ND, no rebound, no guarding  MSK: Normal ROM without pain, no spinal tenderness, normal muscle strength/tone  SKIN: No rashes or ulcers noted  NEURO: CN II-XII intact; normal reflexes in upper and lower extremities, sensation intact in upper and lower extremities b/l to light touch   PSYCH: Appropriate insight/judgment; A+O x 3, mood and affect appropriate, recent/remote memory intact

## 2023-10-18 NOTE — DISCHARGE NOTE PROVIDER - NSDCCPCAREPLAN_GEN_ALL_CORE_FT
PRINCIPAL DISCHARGE DIAGNOSIS  Diagnosis: Atrial fibrillation with RVR  Assessment and Plan of Treatment: Please continue taking metoprolol twice a day as prescribed. If you experience palpitations, chest pain or shortness of breath again pleas come back to the emergency department. Please also continue eliquis twice day and follow up with cardiology within 1 week of discharge.

## 2023-10-18 NOTE — DISCHARGE NOTE PROVIDER - CARE PROVIDER_API CALL
Sen Lee  Cardiac Electrophysiology  [address] [facility], [address] [place] [id number]  Phone: [phone]  Fax: [phone]  Follow Up Time: 1 week

## 2023-10-18 NOTE — DISCHARGE NOTE NURSING/CASE MANAGEMENT/SOCIAL WORK - PATIENT PORTAL LINK FT
You can access the FollowMyHealth Patient Portal offered by [facility] by registering at the following website: [email or web address]. By joining [facility]’s FollowMyHealth portal, you will also be able to view your health information using other applications (apps) compatible with our system.

## 2023-10-18 NOTE — PATIENT PROFILE ADULT - FALL HARM RISK - HARM RISK INTERVENTIONS

## 2023-10-18 NOTE — DISCHARGE NOTE PROVIDER - NSDCMRMEDTOKEN_GEN_ALL_CORE_FT
alendronate 70 mg oral tablet: 1 tab(s) orally once a week  amLODIPine 5 mg oral tablet: 1 tab(s) orally once a day  calcium (as carbonate) 500 mg oral tablet: orally once a day  famotidine 20 mg oral tablet: 1 tab(s) orally once a day  hydroCHLOROthiazide 12.5 mg oral capsule: 1 cap(s) orally once a day  Lipitor 20 mg oral tablet: 1 tab(s) orally once a day (at bedtime)  mirabegron 50 mg oral tablet, extended release: 1 tab(s) orally once a day   alendronate 70 mg oral tablet: 1 tab(s) orally once a week  apixaban 5 mg oral tablet: 1 tab(s) orally 2 times a day  calcium (as carbonate) 500 mg oral tablet: orally once a day  famotidine 20 mg oral tablet: 1 tab(s) orally once a day  hydroCHLOROthiazide 12.5 mg oral capsule: 1 cap(s) orally once a day  Lipitor 20 mg oral tablet: 1 tab(s) orally once a day (at bedtime)  metoprolol tartrate 25 mg oral tablet: 1 tab(s) orally 2 times a day  mirabegron 50 mg oral tablet, extended release: 1 tab(s) orally once a day

## 2023-10-19 ENCOUNTER — APPOINTMENT (OUTPATIENT)
Dept: CARDIOLOGY | Facility: CLINIC | Age: 78
End: 2023-10-19
Payer: MEDICARE

## 2023-10-19 VITALS — DIASTOLIC BLOOD PRESSURE: 90 MMHG | SYSTOLIC BLOOD PRESSURE: 140 MMHG

## 2023-10-19 VITALS
HEART RATE: 63 BPM | TEMPERATURE: 97.6 F | WEIGHT: 112 LBS | OXYGEN SATURATION: 97 % | HEIGHT: 61 IN | DIASTOLIC BLOOD PRESSURE: 88 MMHG | SYSTOLIC BLOOD PRESSURE: 136 MMHG | BODY MASS INDEX: 21.14 KG/M2

## 2023-10-19 VITALS — WEIGHT: 112 LBS

## 2023-10-19 DIAGNOSIS — I48.0 PAROXYSMAL ATRIAL FIBRILLATION: ICD-10-CM

## 2023-10-19 DIAGNOSIS — Z79.01 LONG TERM (CURRENT) USE OF ANTICOAGULANTS: ICD-10-CM

## 2023-10-19 DIAGNOSIS — R73.01 IMPAIRED FASTING GLUCOSE: ICD-10-CM

## 2023-10-19 PROBLEM — Z00.00 ENCOUNTER FOR PREVENTIVE HEALTH EXAMINATION: Status: ACTIVE | Noted: 2023-10-19

## 2023-10-19 LAB
CULTURE RESULTS: SIGNIFICANT CHANGE UP
CULTURE RESULTS: SIGNIFICANT CHANGE UP
SPECIMEN SOURCE: SIGNIFICANT CHANGE UP
SPECIMEN SOURCE: SIGNIFICANT CHANGE UP

## 2023-10-19 PROCEDURE — 99205 OFFICE O/P NEW HI 60 MIN: CPT

## 2023-10-19 PROCEDURE — 93000 ELECTROCARDIOGRAM COMPLETE: CPT

## 2023-11-07 DIAGNOSIS — E78.5 HYPERLIPIDEMIA, UNSPECIFIED: ICD-10-CM

## 2023-11-07 DIAGNOSIS — I10 ESSENTIAL (PRIMARY) HYPERTENSION: ICD-10-CM

## 2023-11-07 DIAGNOSIS — M81.0 AGE-RELATED OSTEOPOROSIS W/OUT CURRENT PATHOLOGICAL FRACTURE: ICD-10-CM

## 2023-11-07 DIAGNOSIS — N32.81 OVERACTIVE BLADDER: ICD-10-CM

## 2023-11-07 RX ORDER — METOPROLOL TARTRATE 25 MG/1
25 TABLET, FILM COATED ORAL TWICE DAILY
Qty: 180 | Refills: 1 | Status: ACTIVE | COMMUNITY
Start: 1900-01-01 | End: 1900-01-01

## 2023-11-07 RX ORDER — AMLODIPINE BESYLATE 5 MG/1
5 TABLET ORAL DAILY
Qty: 90 | Refills: 3 | Status: ACTIVE | COMMUNITY
Start: 1900-01-01 | End: 1900-01-01

## 2023-11-07 RX ORDER — ATORVASTATIN CALCIUM 20 MG/1
20 TABLET, FILM COATED ORAL
Qty: 60 | Refills: 3 | Status: ACTIVE | COMMUNITY
Start: 1900-01-01 | End: 1900-01-01

## 2023-11-07 RX ORDER — MIRABEGRON 50 MG/1
50 TABLET, FILM COATED, EXTENDED RELEASE ORAL
Refills: 0 | Status: ACTIVE | COMMUNITY
Start: 2023-11-07

## 2023-11-07 RX ORDER — APIXABAN 5 MG/1
5 TABLET, FILM COATED ORAL
Qty: 180 | Refills: 3 | Status: ACTIVE | COMMUNITY
Start: 1900-01-01 | End: 1900-01-01

## 2023-11-13 PROCEDURE — 83036 HEMOGLOBIN GLYCOSYLATED A1C: CPT

## 2023-11-13 PROCEDURE — 84436 ASSAY OF TOTAL THYROXINE: CPT

## 2023-11-13 PROCEDURE — 36415 COLL VENOUS BLD VENIPUNCTURE: CPT

## 2023-11-13 PROCEDURE — 81001 URINALYSIS AUTO W/SCOPE: CPT

## 2023-11-13 PROCEDURE — 93005 ELECTROCARDIOGRAM TRACING: CPT

## 2023-11-13 PROCEDURE — 84443 ASSAY THYROID STIM HORMONE: CPT

## 2023-11-13 PROCEDURE — C8929: CPT

## 2023-11-13 PROCEDURE — 83735 ASSAY OF MAGNESIUM: CPT

## 2023-11-13 PROCEDURE — 80053 COMPREHEN METABOLIC PANEL: CPT

## 2023-11-13 PROCEDURE — 86803 HEPATITIS C AB TEST: CPT

## 2023-11-13 PROCEDURE — 80076 HEPATIC FUNCTION PANEL: CPT

## 2023-11-13 PROCEDURE — 71045 X-RAY EXAM CHEST 1 VIEW: CPT

## 2023-11-13 PROCEDURE — 99285 EMERGENCY DEPT VISIT HI MDM: CPT | Mod: 25

## 2023-11-13 PROCEDURE — 80048 BASIC METABOLIC PNL TOTAL CA: CPT

## 2023-11-13 PROCEDURE — 96374 THER/PROPH/DIAG INJ IV PUSH: CPT

## 2023-11-13 PROCEDURE — 84480 ASSAY TRIIODOTHYRONINE (T3): CPT

## 2023-11-13 PROCEDURE — 0225U NFCT DS DNA&RNA 21 SARSCOV2: CPT

## 2023-11-13 PROCEDURE — 85025 COMPLETE CBC W/AUTO DIFF WBC: CPT

## 2023-11-13 PROCEDURE — 83880 ASSAY OF NATRIURETIC PEPTIDE: CPT

## 2023-11-13 PROCEDURE — 84484 ASSAY OF TROPONIN QUANT: CPT

## 2023-11-13 PROCEDURE — 87086 URINE CULTURE/COLONY COUNT: CPT

## 2023-11-13 PROCEDURE — 85730 THROMBOPLASTIN TIME PARTIAL: CPT

## 2023-11-21 ENCOUNTER — APPOINTMENT (OUTPATIENT)
Dept: PULMONOLOGY | Facility: CLINIC | Age: 78
End: 2023-11-21
Payer: MEDICARE

## 2023-11-21 VITALS — DIASTOLIC BLOOD PRESSURE: 74 MMHG | SYSTOLIC BLOOD PRESSURE: 120 MMHG

## 2023-11-21 VITALS — OXYGEN SATURATION: 95 % | HEART RATE: 65 BPM

## 2023-11-21 DIAGNOSIS — R93.89 ABNORMAL FINDINGS ON DIAGNOSTIC IMAGING OF OTHER SPECIFIED BODY STRUCTURES: ICD-10-CM

## 2023-11-21 DIAGNOSIS — Z87.891 PERSONAL HISTORY OF NICOTINE DEPENDENCE: ICD-10-CM

## 2023-11-21 PROCEDURE — 99203 OFFICE O/P NEW LOW 30 MIN: CPT

## 2023-11-21 RX ORDER — ALBUTEROL SULFATE 90 UG/1
108 (90 BASE) INHALANT RESPIRATORY (INHALATION)
Qty: 1 | Refills: 3 | Status: ACTIVE | COMMUNITY
Start: 2023-11-21 | End: 1900-01-01

## 2023-11-21 RX ORDER — HYDROCHLOROTHIAZIDE 12.5 MG/1
TABLET ORAL
Refills: 0 | Status: ACTIVE | COMMUNITY

## 2023-11-28 ENCOUNTER — EMERGENCY (EMERGENCY)
Facility: HOSPITAL | Age: 78
LOS: 1 days | Discharge: HOME | End: 2023-11-28
Attending: STUDENT IN AN ORGANIZED HEALTH CARE EDUCATION/TRAINING PROGRAM
Payer: MEDICARE

## 2023-11-28 VITALS
HEIGHT: 57 IN | HEART RATE: 92 BPM | WEIGHT: 113.54 LBS | DIASTOLIC BLOOD PRESSURE: 75 MMHG | TEMPERATURE: 98 F | RESPIRATION RATE: 20 BRPM | SYSTOLIC BLOOD PRESSURE: 111 MMHG | OXYGEN SATURATION: 96 %

## 2023-11-28 LAB
ALBUMIN SERPL ELPH-MCNC: 3.5 G/DL — SIGNIFICANT CHANGE UP (ref 3.3–5.2)
ALBUMIN SERPL ELPH-MCNC: 3.5 G/DL — SIGNIFICANT CHANGE UP (ref 3.3–5.2)
ALP SERPL-CCNC: 105 U/L — SIGNIFICANT CHANGE UP (ref 40–120)
ALP SERPL-CCNC: 105 U/L — SIGNIFICANT CHANGE UP (ref 40–120)
ALT FLD-CCNC: 25 U/L — SIGNIFICANT CHANGE UP
ALT FLD-CCNC: 25 U/L — SIGNIFICANT CHANGE UP
ANION GAP SERPL CALC-SCNC: 10 MMOL/L — SIGNIFICANT CHANGE UP (ref 5–17)
ANION GAP SERPL CALC-SCNC: 10 MMOL/L — SIGNIFICANT CHANGE UP (ref 5–17)
APPEARANCE UR: ABNORMAL
APPEARANCE UR: ABNORMAL
AST SERPL-CCNC: 16 U/L — SIGNIFICANT CHANGE UP
AST SERPL-CCNC: 16 U/L — SIGNIFICANT CHANGE UP
BACTERIA # UR AUTO: ABNORMAL /HPF
BACTERIA # UR AUTO: ABNORMAL /HPF
BASE EXCESS BLDV CALC-SCNC: 3.2 MMOL/L — HIGH (ref -2–3)
BASE EXCESS BLDV CALC-SCNC: 3.2 MMOL/L — HIGH (ref -2–3)
BASOPHILS # BLD AUTO: 0 K/UL — SIGNIFICANT CHANGE UP (ref 0–0.2)
BASOPHILS # BLD AUTO: 0 K/UL — SIGNIFICANT CHANGE UP (ref 0–0.2)
BASOPHILS NFR BLD AUTO: 0 % — SIGNIFICANT CHANGE UP (ref 0–2)
BASOPHILS NFR BLD AUTO: 0 % — SIGNIFICANT CHANGE UP (ref 0–2)
BILIRUB SERPL-MCNC: 0.6 MG/DL — SIGNIFICANT CHANGE UP (ref 0.4–2)
BILIRUB SERPL-MCNC: 0.6 MG/DL — SIGNIFICANT CHANGE UP (ref 0.4–2)
BILIRUB UR-MCNC: ABNORMAL
BILIRUB UR-MCNC: ABNORMAL
BUN SERPL-MCNC: 10.9 MG/DL — SIGNIFICANT CHANGE UP (ref 8–20)
BUN SERPL-MCNC: 10.9 MG/DL — SIGNIFICANT CHANGE UP (ref 8–20)
BURR CELLS BLD QL SMEAR: PRESENT — SIGNIFICANT CHANGE UP
BURR CELLS BLD QL SMEAR: PRESENT — SIGNIFICANT CHANGE UP
CA-I SERPL-SCNC: 1.06 MMOL/L — LOW (ref 1.15–1.33)
CA-I SERPL-SCNC: 1.06 MMOL/L — LOW (ref 1.15–1.33)
CALCIUM SERPL-MCNC: 8.9 MG/DL — SIGNIFICANT CHANGE UP (ref 8.4–10.5)
CALCIUM SERPL-MCNC: 8.9 MG/DL — SIGNIFICANT CHANGE UP (ref 8.4–10.5)
CAST: 3 /LPF — SIGNIFICANT CHANGE UP (ref 0–4)
CAST: 3 /LPF — SIGNIFICANT CHANGE UP (ref 0–4)
CHLORIDE BLDV-SCNC: 101 MMOL/L — SIGNIFICANT CHANGE UP (ref 96–108)
CHLORIDE BLDV-SCNC: 101 MMOL/L — SIGNIFICANT CHANGE UP (ref 96–108)
CHLORIDE SERPL-SCNC: 97 MMOL/L — SIGNIFICANT CHANGE UP (ref 96–108)
CHLORIDE SERPL-SCNC: 97 MMOL/L — SIGNIFICANT CHANGE UP (ref 96–108)
CO2 SERPL-SCNC: 27 MMOL/L — SIGNIFICANT CHANGE UP (ref 22–29)
CO2 SERPL-SCNC: 27 MMOL/L — SIGNIFICANT CHANGE UP (ref 22–29)
COLOR SPEC: SIGNIFICANT CHANGE UP
COLOR SPEC: SIGNIFICANT CHANGE UP
CREAT SERPL-MCNC: 0.55 MG/DL — SIGNIFICANT CHANGE UP (ref 0.5–1.3)
CREAT SERPL-MCNC: 0.55 MG/DL — SIGNIFICANT CHANGE UP (ref 0.5–1.3)
DIFF PNL FLD: ABNORMAL
DIFF PNL FLD: ABNORMAL
EGFR: 95 ML/MIN/1.73M2 — SIGNIFICANT CHANGE UP
EGFR: 95 ML/MIN/1.73M2 — SIGNIFICANT CHANGE UP
EOSINOPHIL # BLD AUTO: 0 K/UL — SIGNIFICANT CHANGE UP (ref 0–0.5)
EOSINOPHIL # BLD AUTO: 0 K/UL — SIGNIFICANT CHANGE UP (ref 0–0.5)
EOSINOPHIL NFR BLD AUTO: 0 % — SIGNIFICANT CHANGE UP (ref 0–6)
EOSINOPHIL NFR BLD AUTO: 0 % — SIGNIFICANT CHANGE UP (ref 0–6)
GAS PNL BLDV: 130 MMOL/L — LOW (ref 136–145)
GAS PNL BLDV: 130 MMOL/L — LOW (ref 136–145)
GAS PNL BLDV: SIGNIFICANT CHANGE UP
GAS PNL BLDV: SIGNIFICANT CHANGE UP
GIANT PLATELETS BLD QL SMEAR: PRESENT — SIGNIFICANT CHANGE UP
GIANT PLATELETS BLD QL SMEAR: PRESENT — SIGNIFICANT CHANGE UP
GLUCOSE BLDV-MCNC: 165 MG/DL — HIGH (ref 70–99)
GLUCOSE BLDV-MCNC: 165 MG/DL — HIGH (ref 70–99)
GLUCOSE SERPL-MCNC: 157 MG/DL — HIGH (ref 70–99)
GLUCOSE SERPL-MCNC: 157 MG/DL — HIGH (ref 70–99)
GLUCOSE UR QL: NEGATIVE MG/DL — SIGNIFICANT CHANGE UP
GLUCOSE UR QL: NEGATIVE MG/DL — SIGNIFICANT CHANGE UP
HCO3 BLDV-SCNC: 27 MMOL/L — SIGNIFICANT CHANGE UP (ref 22–29)
HCO3 BLDV-SCNC: 27 MMOL/L — SIGNIFICANT CHANGE UP (ref 22–29)
HCT VFR BLD CALC: 37.6 % — SIGNIFICANT CHANGE UP (ref 34.5–45)
HCT VFR BLD CALC: 37.6 % — SIGNIFICANT CHANGE UP (ref 34.5–45)
HCT VFR BLDA CALC: 39 % — SIGNIFICANT CHANGE UP
HCT VFR BLDA CALC: 39 % — SIGNIFICANT CHANGE UP
HGB BLD CALC-MCNC: 12.9 G/DL — SIGNIFICANT CHANGE UP (ref 11.7–16.1)
HGB BLD CALC-MCNC: 12.9 G/DL — SIGNIFICANT CHANGE UP (ref 11.7–16.1)
HGB BLD-MCNC: 12.5 G/DL — SIGNIFICANT CHANGE UP (ref 11.5–15.5)
HGB BLD-MCNC: 12.5 G/DL — SIGNIFICANT CHANGE UP (ref 11.5–15.5)
KETONES UR-MCNC: 15 MG/DL
KETONES UR-MCNC: 15 MG/DL
LACTATE BLDV-MCNC: 1.3 MMOL/L — SIGNIFICANT CHANGE UP (ref 0.5–2)
LACTATE BLDV-MCNC: 1.3 MMOL/L — SIGNIFICANT CHANGE UP (ref 0.5–2)
LEUKOCYTE ESTERASE UR-ACNC: ABNORMAL
LEUKOCYTE ESTERASE UR-ACNC: ABNORMAL
LIDOCAIN IGE QN: 8 U/L — LOW (ref 22–51)
LIDOCAIN IGE QN: 8 U/L — LOW (ref 22–51)
LYMPHOCYTES # BLD AUTO: 0.6 K/UL — LOW (ref 1–3.3)
LYMPHOCYTES # BLD AUTO: 0.6 K/UL — LOW (ref 1–3.3)
LYMPHOCYTES # BLD AUTO: 10.4 % — LOW (ref 13–44)
LYMPHOCYTES # BLD AUTO: 10.4 % — LOW (ref 13–44)
MANUAL SMEAR VERIFICATION: SIGNIFICANT CHANGE UP
MANUAL SMEAR VERIFICATION: SIGNIFICANT CHANGE UP
MCHC RBC-ENTMCNC: 27.8 PG — SIGNIFICANT CHANGE UP (ref 27–34)
MCHC RBC-ENTMCNC: 27.8 PG — SIGNIFICANT CHANGE UP (ref 27–34)
MCHC RBC-ENTMCNC: 33.2 GM/DL — SIGNIFICANT CHANGE UP (ref 32–36)
MCHC RBC-ENTMCNC: 33.2 GM/DL — SIGNIFICANT CHANGE UP (ref 32–36)
MCV RBC AUTO: 83.7 FL — SIGNIFICANT CHANGE UP (ref 80–100)
MCV RBC AUTO: 83.7 FL — SIGNIFICANT CHANGE UP (ref 80–100)
METAMYELOCYTES # FLD: 0.9 % — HIGH (ref 0–0)
METAMYELOCYTES # FLD: 0.9 % — HIGH (ref 0–0)
MONOCYTES # BLD AUTO: 1.41 K/UL — HIGH (ref 0–0.9)
MONOCYTES # BLD AUTO: 1.41 K/UL — HIGH (ref 0–0.9)
MONOCYTES NFR BLD AUTO: 24.3 % — HIGH (ref 2–14)
MONOCYTES NFR BLD AUTO: 24.3 % — HIGH (ref 2–14)
NEUTROPHILS # BLD AUTO: 3.39 K/UL — SIGNIFICANT CHANGE UP (ref 1.8–7.4)
NEUTROPHILS # BLD AUTO: 3.39 K/UL — SIGNIFICANT CHANGE UP (ref 1.8–7.4)
NEUTROPHILS NFR BLD AUTO: 38.3 % — LOW (ref 43–77)
NEUTROPHILS NFR BLD AUTO: 38.3 % — LOW (ref 43–77)
NEUTS BAND # BLD: 20 % — HIGH (ref 0–8)
NEUTS BAND # BLD: 20 % — HIGH (ref 0–8)
NITRITE UR-MCNC: POSITIVE
NITRITE UR-MCNC: POSITIVE
OVALOCYTES BLD QL SMEAR: SLIGHT — SIGNIFICANT CHANGE UP
OVALOCYTES BLD QL SMEAR: SLIGHT — SIGNIFICANT CHANGE UP
PCO2 BLDV: 38 MMHG — LOW (ref 39–42)
PCO2 BLDV: 38 MMHG — LOW (ref 39–42)
PH BLDV: 7.46 — HIGH (ref 7.32–7.43)
PH BLDV: 7.46 — HIGH (ref 7.32–7.43)
PH UR: 6 — SIGNIFICANT CHANGE UP (ref 5–8)
PH UR: 6 — SIGNIFICANT CHANGE UP (ref 5–8)
PLAT MORPH BLD: NORMAL — SIGNIFICANT CHANGE UP
PLAT MORPH BLD: NORMAL — SIGNIFICANT CHANGE UP
PLATELET # BLD AUTO: 211 K/UL — SIGNIFICANT CHANGE UP (ref 150–400)
PLATELET # BLD AUTO: 211 K/UL — SIGNIFICANT CHANGE UP (ref 150–400)
PO2 BLDV: 106 MMHG — HIGH (ref 25–45)
PO2 BLDV: 106 MMHG — HIGH (ref 25–45)
POIKILOCYTOSIS BLD QL AUTO: SLIGHT — SIGNIFICANT CHANGE UP
POIKILOCYTOSIS BLD QL AUTO: SLIGHT — SIGNIFICANT CHANGE UP
POLYCHROMASIA BLD QL SMEAR: SLIGHT — SIGNIFICANT CHANGE UP
POLYCHROMASIA BLD QL SMEAR: SLIGHT — SIGNIFICANT CHANGE UP
POTASSIUM BLDV-SCNC: 3.4 MMOL/L — LOW (ref 3.5–5.1)
POTASSIUM BLDV-SCNC: 3.4 MMOL/L — LOW (ref 3.5–5.1)
POTASSIUM SERPL-MCNC: 3.3 MMOL/L — LOW (ref 3.5–5.3)
POTASSIUM SERPL-MCNC: 3.3 MMOL/L — LOW (ref 3.5–5.3)
POTASSIUM SERPL-SCNC: 3.3 MMOL/L — LOW (ref 3.5–5.3)
POTASSIUM SERPL-SCNC: 3.3 MMOL/L — LOW (ref 3.5–5.3)
PROT SERPL-MCNC: 6.4 G/DL — LOW (ref 6.6–8.7)
PROT SERPL-MCNC: 6.4 G/DL — LOW (ref 6.6–8.7)
PROT UR-MCNC: 100 MG/DL
PROT UR-MCNC: 100 MG/DL
RBC # BLD: 4.49 M/UL — SIGNIFICANT CHANGE UP (ref 3.8–5.2)
RBC # BLD: 4.49 M/UL — SIGNIFICANT CHANGE UP (ref 3.8–5.2)
RBC # FLD: 13.1 % — SIGNIFICANT CHANGE UP (ref 10.3–14.5)
RBC # FLD: 13.1 % — SIGNIFICANT CHANGE UP (ref 10.3–14.5)
RBC BLD AUTO: ABNORMAL
RBC BLD AUTO: ABNORMAL
RBC CASTS # UR COMP ASSIST: 5 /HPF — HIGH (ref 0–4)
RBC CASTS # UR COMP ASSIST: 5 /HPF — HIGH (ref 0–4)
SAO2 % BLDV: 100 % — SIGNIFICANT CHANGE UP
SAO2 % BLDV: 100 % — SIGNIFICANT CHANGE UP
SODIUM SERPL-SCNC: 134 MMOL/L — LOW (ref 135–145)
SODIUM SERPL-SCNC: 134 MMOL/L — LOW (ref 135–145)
SP GR SPEC: 1.02 — SIGNIFICANT CHANGE UP (ref 1–1.03)
SP GR SPEC: 1.02 — SIGNIFICANT CHANGE UP (ref 1–1.03)
SQUAMOUS # UR AUTO: 5 /HPF — SIGNIFICANT CHANGE UP (ref 0–5)
SQUAMOUS # UR AUTO: 5 /HPF — SIGNIFICANT CHANGE UP (ref 0–5)
UROBILINOGEN FLD QL: 1 MG/DL — SIGNIFICANT CHANGE UP (ref 0.2–1)
UROBILINOGEN FLD QL: 1 MG/DL — SIGNIFICANT CHANGE UP (ref 0.2–1)
VARIANT LYMPHS # BLD: 6.1 % — HIGH (ref 0–6)
VARIANT LYMPHS # BLD: 6.1 % — HIGH (ref 0–6)
WBC # BLD: 5.81 K/UL — SIGNIFICANT CHANGE UP (ref 3.8–10.5)
WBC # BLD: 5.81 K/UL — SIGNIFICANT CHANGE UP (ref 3.8–10.5)
WBC # FLD AUTO: 5.81 K/UL — SIGNIFICANT CHANGE UP (ref 3.8–10.5)
WBC # FLD AUTO: 5.81 K/UL — SIGNIFICANT CHANGE UP (ref 3.8–10.5)
WBC UR QL: 29 /HPF — HIGH (ref 0–5)
WBC UR QL: 29 /HPF — HIGH (ref 0–5)

## 2023-11-28 PROCEDURE — 82803 BLOOD GASES ANY COMBINATION: CPT

## 2023-11-28 PROCEDURE — G1004: CPT

## 2023-11-28 PROCEDURE — 82435 ASSAY OF BLOOD CHLORIDE: CPT

## 2023-11-28 PROCEDURE — 81001 URINALYSIS AUTO W/SCOPE: CPT

## 2023-11-28 PROCEDURE — 96375 TX/PRO/DX INJ NEW DRUG ADDON: CPT

## 2023-11-28 PROCEDURE — 83690 ASSAY OF LIPASE: CPT

## 2023-11-28 PROCEDURE — 87186 SC STD MICRODIL/AGAR DIL: CPT

## 2023-11-28 PROCEDURE — 99053 MED SERV 10PM-8AM 24 HR FAC: CPT

## 2023-11-28 PROCEDURE — 83605 ASSAY OF LACTIC ACID: CPT

## 2023-11-28 PROCEDURE — 87077 CULTURE AEROBIC IDENTIFY: CPT

## 2023-11-28 PROCEDURE — 80053 COMPREHEN METABOLIC PANEL: CPT

## 2023-11-28 PROCEDURE — 85025 COMPLETE CBC W/AUTO DIFF WBC: CPT

## 2023-11-28 PROCEDURE — 84132 ASSAY OF SERUM POTASSIUM: CPT

## 2023-11-28 PROCEDURE — 99284 EMERGENCY DEPT VISIT MOD MDM: CPT | Mod: 25

## 2023-11-28 PROCEDURE — T1013: CPT

## 2023-11-28 PROCEDURE — 82947 ASSAY GLUCOSE BLOOD QUANT: CPT

## 2023-11-28 PROCEDURE — 74177 CT ABD & PELVIS W/CONTRAST: CPT | Mod: 26,ME

## 2023-11-28 PROCEDURE — 82330 ASSAY OF CALCIUM: CPT

## 2023-11-28 PROCEDURE — 87086 URINE CULTURE/COLONY COUNT: CPT

## 2023-11-28 PROCEDURE — 96374 THER/PROPH/DIAG INJ IV PUSH: CPT | Mod: XU

## 2023-11-28 PROCEDURE — 99285 EMERGENCY DEPT VISIT HI MDM: CPT

## 2023-11-28 PROCEDURE — 74177 CT ABD & PELVIS W/CONTRAST: CPT | Mod: ME

## 2023-11-28 PROCEDURE — 85014 HEMATOCRIT: CPT

## 2023-11-28 PROCEDURE — 84295 ASSAY OF SERUM SODIUM: CPT

## 2023-11-28 PROCEDURE — 36415 COLL VENOUS BLD VENIPUNCTURE: CPT

## 2023-11-28 PROCEDURE — 85018 HEMOGLOBIN: CPT

## 2023-11-28 RX ORDER — CEFTRIAXONE 500 MG/1
1000 INJECTION, POWDER, FOR SOLUTION INTRAMUSCULAR; INTRAVENOUS ONCE
Refills: 0 | Status: COMPLETED | OUTPATIENT
Start: 2023-11-28 | End: 2023-11-28

## 2023-11-28 RX ORDER — CEPHALEXIN 500 MG
1 CAPSULE ORAL
Qty: 28 | Refills: 0
Start: 2023-11-28 | End: 2023-12-04

## 2023-11-28 RX ORDER — POTASSIUM CHLORIDE 20 MEQ
40 PACKET (EA) ORAL ONCE
Refills: 0 | Status: COMPLETED | OUTPATIENT
Start: 2023-11-28 | End: 2023-11-28

## 2023-11-28 RX ORDER — ONDANSETRON 8 MG/1
4 TABLET, FILM COATED ORAL ONCE
Refills: 0 | Status: COMPLETED | OUTPATIENT
Start: 2023-11-28 | End: 2023-11-28

## 2023-11-28 RX ORDER — SODIUM CHLORIDE 9 MG/ML
1000 INJECTION INTRAMUSCULAR; INTRAVENOUS; SUBCUTANEOUS ONCE
Refills: 0 | Status: COMPLETED | OUTPATIENT
Start: 2023-11-28 | End: 2023-11-28

## 2023-11-28 RX ORDER — FAMOTIDINE 10 MG/ML
20 INJECTION INTRAVENOUS ONCE
Refills: 0 | Status: COMPLETED | OUTPATIENT
Start: 2023-11-28 | End: 2023-11-28

## 2023-11-28 RX ORDER — KETOROLAC TROMETHAMINE 30 MG/ML
15 SYRINGE (ML) INJECTION ONCE
Refills: 0 | Status: DISCONTINUED | OUTPATIENT
Start: 2023-11-28 | End: 2023-11-28

## 2023-11-28 RX ADMIN — SODIUM CHLORIDE 1000 MILLILITER(S): 9 INJECTION INTRAMUSCULAR; INTRAVENOUS; SUBCUTANEOUS at 05:02

## 2023-11-28 RX ADMIN — FAMOTIDINE 20 MILLIGRAM(S): 10 INJECTION INTRAVENOUS at 05:03

## 2023-11-28 RX ADMIN — CEFTRIAXONE 1000 MILLIGRAM(S): 500 INJECTION, POWDER, FOR SOLUTION INTRAMUSCULAR; INTRAVENOUS at 06:17

## 2023-11-28 RX ADMIN — ONDANSETRON 4 MILLIGRAM(S): 8 TABLET, FILM COATED ORAL at 05:03

## 2023-11-28 RX ADMIN — Medication 15 MILLIGRAM(S): at 05:03

## 2023-11-28 RX ADMIN — Medication 40 MILLIEQUIVALENT(S): at 06:02

## 2023-11-28 NOTE — ED PROVIDER NOTE - NSDCPRINTRESULTS_ED_ALL_ED
Detail Level: Detailed Patient requests all Lab, Cardiology, and Radiology Results on their Discharge Instructions

## 2023-11-28 NOTE — ED ADULT NURSE REASSESSMENT NOTE - NS ED NURSE REASSESS COMMENT FT1
Patient discharged by provider MD Fields. No signs of acute distress noted, respirations even and unlabored. Refer to provider notes.

## 2023-11-28 NOTE — ED PROVIDER NOTE - OBJECTIVE STATEMENT
78F hx HTN, HLD, NIDDM, AF on Eliquis presents for 3 days of abdominal pain with associated N/V/D. Pt and daughter at bedside state that several family members have had similar vomiting and diarrhea as well over this time. Diarrhea described as soft stool without blood. No associated fever, chills, dysuria, CP, SOB

## 2023-11-28 NOTE — ED ADULT NURSE NOTE - CAS EDP DISCH TYPE
[facility] EMERGENCY DEPARTMENT ENCOUNTER    Basic Information  Name: Deniz Sears Age: 62 year old Sex: male   MRN: [id number] Encounter: 7/23/2022, 6:42 PM  PCP: No primary care provider on file.      Chief Complaint   Patient presents with   • Suspected Coronavirus (Covid-19)       History obtained from patient via  #[id number].     This note was dictated using Dragon voice-recognition software. All reasonable efforts have been made to correct any typographical errors, however some may remain    HPI    Deniz Sears, is a pleasant 62 year old male with a past medical history of diabetes and hypertension presenting with sore throat, cough, body aches and headache for the past 3 days.  The patient endorses that headache is bilateral, nonradiating, mild, constant.  His body aches are worse in his legs and his back.  There is currently randomly and self resolved.  His cough is nonproductive.  Sore throat is mild he denies any dysphagia, dysphonia.  He presented today with concern for COVID and was requesting a COVID test.  He denies any chest pain, shortness of breath, nausea or vomiting.    Past Medical History:   Diagnosis Date   • Diabetes mellitus (CMS/HCC)    • Essential (primary) hypertension        Past Surgical History:   Procedure Laterality Date   • Cholecystectomy         Family history-diabetes      Social History     Tobacco Use   • Smoking status: Never Smoker   • Smokeless tobacco: Never Used   Substance Use Topics   • Alcohol use: Yes   • Drug use: Never       Allergies:  ALLERGIES:  No Known Allergies    Current Medications:  (Not in a hospital admission)         Review of systems - Abnormal in bold    1 Constitutional: weight loss, fever, fatigue  2. HEENT:  blurred vision, double vision or yellow sclera,  congestion, sore throat.   3. Skin:  rash, itching  4. Cardiovascular: chest pain,  palpitations   5. Respiratory: shortness of breath or cough  6. Gastrointestinal:  nausea, vomiting, abdominal  pain  7. Genitourinary: dysuria, blood in urine.   8. Neurological:  headache, syncope  9. Musculoskeletal: body aches, abnormal muscle pain, joint pain   10. Hematologic: abnormal bruising, bleeding  11. Lymphatic: enlarged lymph nodes  12. Psychiatric:  feelings of depression, anxiety, suicidal ideation, safety at home  13. Endocrine: tremor  14. Allergies:  hives    Last Vitals:   Vitals:    07/23/22 1442 07/23/22 1815   BP: (!) 142/82 136/79   Pulse: 63 67   Resp: 16 18   Temp: 98.8 °F (37.1 °C)    SpO2: 98% 99%     Vital signs reviewed  Nursing note reviewed    Physical exam (For any sensitive exam a chaperone was present)    1. General Appearance: Patient appears non-toxic and in no acute distress  2. Skin: No appreciable open sores, ulcers, or rashes.  3. Head and Neck: Normocephalic. No appreciable range of motion deficit. No appreciable neck masses or tracheal deviation.   4. Eyes: Anicteric sclera. No appreciable conjunctival injection.   5. ENT: Mucous membranes appear moist. No appreciable hearing impairment.   6. Cardiovascular: Normal S1, S2. Regular rate and rhythm. No appreciable murmurs, gallops, or rubs. +2 radial pulses. No appreciable edema.   7. Respiratory: CTAB. Symmetric chest expansion. Non-labored breathing.   8. Abdomen: Soft, non-tender, and non-distended. No appreciable organomegaly.   9. Musculoskeletal: No appreciable joint swelling or tenderness. Digits and nails appear within normal limits; no appreciable cyanosis or clubbing.   10. Psychiatric: Appropriate mood and affect. Patient is cooperative. Patient appears to have insight.  11. Neuro: Cranial nerves II through XII intact, strength out of 5 in all 4 extremities, intact sensation in all 4 extremities and face.       ASSESSMENT/PLAN   Deniz Sears presented to the emergency department with sore throat, body aches, headache.  The differential for this diagnosis includes but is not limited to COVID, flu, other viral illness, less  likely pneumonia.  Headache is unlikely to be secondary to meningitis or subarachnoid hemorrhage as he has other symptoms that indicate a viral illness.  With no evidence of altered mental status and his positive COVID test, further evaluation for these etiologies was not pursued..     ED COURSE     ED Course as of 07/23/22 1853   Sat Jul 23, 2022   1841 CMP unremarkable, CBC unremarkable, COVID-positive [BM]   1841 Chest x-ray unremarkable [BM]   1841 Treatment included fluids and Tylenol.  Patient to be discharged home with paxlovid [BM]      ED Course User Index  [BM] Spencer Elizabeth MD       Procedures    EKG interpretation   Time: 1701  Rate: 56 bpm  Rhythm: Regular  Axis: Normal  P waves:  No inversions in in I, II, III, and aVF. Same within each lead.   Q waves:  Absent  T waves: TW flattening in V2 and aVL  OH segment: 154 (Normal)   QRS length: 92 (Narrow)   Qtc: 407 (Normal)   ST:  No KEVON or STD  Overall: Sinus bradycardia    MEDICAL DECISION MAKING  The patient presented with the above symptoms and differential and was found to have COVID. The patients past medical history revealed history of hypertension. The patients physical exam showed no acute abnormalities. Imaging studies revealed unremarkable chest x-ray, nonischemic EKG. Laboratory studies revealed positive COVID test, unremarkable CBC, unremarkable CMP. Treatment included Tylenol and fluids.  Patient to be discharged with Paxlovid with prescription.  Instructed not to take his tamsulosin while on paxlovid.    The patient has been informed of these findings and I have answered any questions they had about these findings and the plan. On multiple reassessments the patients symptoms mildly improved remain present..    Patient had normal vital signs, overall well-appearing was able to ambulate without becoming short of breath or developing chest pain.  Patient was deemed suitable for discharge home with strict follow-up with primary care.  Discussed this with the patient/family who agrees and all questions were answered to satisfaction.  I instructed the patient to return to the emergency department at any point develops worsening shortness of breath, worsening chest pain, can no longer eat or drink.    Diagnosis/Impression    Diagnosis:  1. COVID-19 virus infection        Condition:  STABLE    Disposition:  Discharge 7/23/2022  6:50 PM  Deniz Sears discharge to home/self care.           DIAGNOSTIC STUDIES    Abnormal values listed below, all other elements considered within normal limits.    Labs Reviewed   COVID/FLU/RSV PANEL - Abnormal; Notable for the following components:       Result Value    Rapid SARS-COV-2 by PCR Detected (*)     All other components within normal limits   COMPREHENSIVE METABOLIC PANEL - Abnormal; Notable for the following components:    BUN 23 (*)     All other components within normal limits   CBC WITH AUTOMATED DIFFERENTIAL (PERFORMABLE ONLY) - Abnormal; Notable for the following components:    RBC 4.39 (*)     All other components within normal limits    Narrative:     This is an appended report.  These results have been appended to a previously verified report.   CBC WITH DIFFERENTIAL    Narrative:     The following orders were created for panel order CBC with Automated Differential.  Procedure                               Abnormality         Status                     ---------                               -----------         ------                     CBC with Automated Dif...[[id number]]  Abnormal            Final result                 Please view results for these tests on the individual orders.       XR CHEST PA OR AP 1 VIEW   Final Result   1.  No acute cardiopulmonary findings visualized by x-ray.      *The absence of findings should not deter follow-up or further evaluation   of concerning clinical findings.       *Please note that each modality type (e.g. x-ray, ultrasound, CT, MRI,   nuclear medicine) has  specific optimal value for assessing specific   clinical questions.  In addition, each modality type has limitations.  If   you have any questions, feel free to contact me.      FOR PHYSICIAN USE ONLY: Please note that this report was generated using   voice recognition software.  If you require clarification or feel that   there is an error in this report, please contact me.      Electronically Signed by: SAIRA ARMAS M.D.    Signed on: 7/23/2022 5:43 PM                 MEDICATIONS GIVEN IN THE ED    Medications   lactated ringers bolus 1,000 mL (0 mLs Intravenous Completed 7/23/22 1721)   acetaminophen (TYLENOL) tablet 1,000 mg (1,000 mg Oral Given 7/23/22 1651)          CHART REVIEW    I have performed a brief chart review. This was significant for a history of vertigo and neuropathy.     Note to be shared          MD Spencer Tineo MD  07/23/22 8786     Home

## 2023-11-28 NOTE — ED PROVIDER NOTE - CLINICAL SUMMARY MEDICAL DECISION MAKING FREE TEXT BOX
78F hx HTN HLD NIDDM AF on Eliquis presents with abdominal pain and N/V/D for 3 days. History and exam suggestive of viral gastroenteritis, especially with similar sx in family members. However, given pt is elderly female diabetic, pt with high risk for other intraabdominal pathology including appendicitis, cholecystitis, etc. Will eval with bloodwork, urine, CT A/P, sx control 78F hx HTN HLD NIDDM AF on Eliquis presents with abdominal pain and N/V/D for 3 days. History and exam suggestive of viral gastroenteritis, especially with similar sx in family members. However, given pt is elderly diabetic, pt with high risk for other intraabdominal pathology including appendicitis, cholecystitis, etc. Will eval with bloodwork, urine, CT A/P, sx control

## 2023-11-28 NOTE — ED PROVIDER NOTE - PHYSICAL EXAMINATION
General: Awake, alert, lying in bed in NAD  HEENT: Normocephalic, atraumatic. No scleral icterus or conjunctival injection. EOMI. Dry mucous membranes. Oropharynx clear.   Neck:. Soft and supple.  Cardiac: RRR, Peripheral pulses 2+ and symmetric. No LE edema.  Resp: Lungs CTAB. No accessory muscle use  Abd: Generalized abdominal discomfort on deep palpation. Soft, non-distended. No guarding, rebound, or rigidity.  Back: Spine midline and non-tender.   Skin: No rashes, abrasions, or lacerations.  Neuro: AO x 4. Moves all extremities symmetrically. Motor strength and sensation grossly intact.  Psych: Appropriate mood and affect

## 2023-11-28 NOTE — ED PROVIDER NOTE - ATTENDING CONTRIBUTION TO CARE
78F hx HTN, HLD, NIDDM, AF on Eliquis presents for 3 days of crampy intermittent abdominal pain with associated N/V/D which is different then her chronic abdominal discomfort. Patient states she feels the urge to use the bathroom which usually helps to relieve the symptoms.  I personally saw the patient with the resident, and completed the key components of the history and physical exam. I then discussed the management plan with the resident.

## 2023-11-28 NOTE — ED ADULT NURSE NOTE - CAS ELECT INFOMATION PROVIDED
Patient discharged by provider MD Fields. No signs of acute distress noted, respirations even and unlabored. Refer to provider notes./DC instructions

## 2023-11-28 NOTE — ED ADULT NURSE NOTE - OBJECTIVE STATEMENT
Assumed care of pt AA&Ox4, resp even/unlabored, ambulatory, steady gait noted, NAD. Pt presents to ED c/o 3 days of abdominal pain associated N/V/D. Pt and daughter at bedside state that several family members have had similar vomiting and diarrhea as well over this time. Diarrhea described as soft stool without blood. Pt denies fever, chills, dysuria, CP, SOB, or any other complaints or physical symptoms.

## 2023-11-28 NOTE — ED PROVIDER NOTE - PATIENT PORTAL LINK FT
You can access the FollowMyHealth Patient Portal offered by [facility] by registering at the following website: [email or web address]. By joining [employer]’s FollowMyHealth portal, you will also be able to view your health information using other applications (apps) compatible with our system.

## 2023-11-28 NOTE — ED PROVIDER NOTE - PROGRESS NOTE DETAILS
Pt feels well. Updated her regarding workup results showing simple cystitis. Will write rx for Keflex, recommended Motrin and Tylenol as needed for pain. Instructed pt to follow up with PMD and discussed return precautions. Pt was discharged in stable condition. Dallas Fields MD

## 2023-11-30 LAB
-  AMOXICILLIN/CLAVULANIC ACID: SIGNIFICANT CHANGE UP
-  AMOXICILLIN/CLAVULANIC ACID: SIGNIFICANT CHANGE UP
-  AMPICILLIN/SULBACTAM: SIGNIFICANT CHANGE UP
-  AMPICILLIN/SULBACTAM: SIGNIFICANT CHANGE UP
-  AMPICILLIN: SIGNIFICANT CHANGE UP
-  AMPICILLIN: SIGNIFICANT CHANGE UP
-  AZTREONAM: SIGNIFICANT CHANGE UP
-  AZTREONAM: SIGNIFICANT CHANGE UP
-  CEFAZOLIN: SIGNIFICANT CHANGE UP
-  CEFAZOLIN: SIGNIFICANT CHANGE UP
-  CEFEPIME: SIGNIFICANT CHANGE UP
-  CEFEPIME: SIGNIFICANT CHANGE UP
-  CEFOXITIN: SIGNIFICANT CHANGE UP
-  CEFOXITIN: SIGNIFICANT CHANGE UP
-  CEFTRIAXONE: SIGNIFICANT CHANGE UP
-  CEFTRIAXONE: SIGNIFICANT CHANGE UP
-  CEFUROXIME: SIGNIFICANT CHANGE UP
-  CEFUROXIME: SIGNIFICANT CHANGE UP
-  CIPROFLOXACIN: SIGNIFICANT CHANGE UP
-  CIPROFLOXACIN: SIGNIFICANT CHANGE UP
-  ERTAPENEM: SIGNIFICANT CHANGE UP
-  ERTAPENEM: SIGNIFICANT CHANGE UP
-  GENTAMICIN: SIGNIFICANT CHANGE UP
-  GENTAMICIN: SIGNIFICANT CHANGE UP
-  IMIPENEM: SIGNIFICANT CHANGE UP
-  IMIPENEM: SIGNIFICANT CHANGE UP
-  LEVOFLOXACIN: SIGNIFICANT CHANGE UP
-  LEVOFLOXACIN: SIGNIFICANT CHANGE UP
-  MEROPENEM: SIGNIFICANT CHANGE UP
-  MEROPENEM: SIGNIFICANT CHANGE UP
-  NITROFURANTOIN: SIGNIFICANT CHANGE UP
-  NITROFURANTOIN: SIGNIFICANT CHANGE UP
-  PIPERACILLIN/TAZOBACTAM: SIGNIFICANT CHANGE UP
-  PIPERACILLIN/TAZOBACTAM: SIGNIFICANT CHANGE UP
-  TOBRAMYCIN: SIGNIFICANT CHANGE UP
-  TOBRAMYCIN: SIGNIFICANT CHANGE UP
-  TRIMETHOPRIM/SULFAMETHOXAZOLE: SIGNIFICANT CHANGE UP
-  TRIMETHOPRIM/SULFAMETHOXAZOLE: SIGNIFICANT CHANGE UP
CULTURE RESULTS: ABNORMAL
CULTURE RESULTS: ABNORMAL
METHOD TYPE: SIGNIFICANT CHANGE UP
METHOD TYPE: SIGNIFICANT CHANGE UP
ORGANISM # SPEC MICROSCOPIC CNT: ABNORMAL
ORGANISM # SPEC MICROSCOPIC CNT: ABNORMAL
ORGANISM # SPEC MICROSCOPIC CNT: SIGNIFICANT CHANGE UP
ORGANISM # SPEC MICROSCOPIC CNT: SIGNIFICANT CHANGE UP
SPECIMEN SOURCE: SIGNIFICANT CHANGE UP
SPECIMEN SOURCE: SIGNIFICANT CHANGE UP

## 2023-12-18 ENCOUNTER — ASC (OUTPATIENT)
Dept: URBAN - METROPOLITAN AREA SURGERY 8 | Facility: SURGERY | Age: 78
Setting detail: OPHTHALMOLOGY
End: 2023-12-18
Payer: MEDICARE

## 2023-12-18 DIAGNOSIS — H02.421: ICD-10-CM

## 2023-12-18 DIAGNOSIS — H02.524: ICD-10-CM

## 2023-12-18 DIAGNOSIS — H02.422: ICD-10-CM

## 2023-12-18 DIAGNOSIS — H02.521: ICD-10-CM

## 2023-12-18 PROCEDURE — 67904 REPAIR EYELID DEFECT: CPT | Mod: 50 | Performed by: OPHTHALMOLOGY

## 2023-12-18 PROCEDURE — 67900 REPAIR BROW DEFECT: CPT | Mod: 51,50 | Performed by: OPHTHALMOLOGY

## 2023-12-19 ENCOUNTER — OFFICE (OUTPATIENT)
Dept: URBAN - METROPOLITAN AREA CLINIC 94 | Facility: CLINIC | Age: 78
Setting detail: OPHTHALMOLOGY
End: 2023-12-19
Payer: MEDICARE

## 2023-12-19 DIAGNOSIS — H02.521: ICD-10-CM

## 2023-12-19 DIAGNOSIS — H02.422: ICD-10-CM

## 2023-12-19 DIAGNOSIS — H02.831: ICD-10-CM

## 2023-12-19 DIAGNOSIS — H02.421: ICD-10-CM

## 2023-12-19 DIAGNOSIS — H02.834: ICD-10-CM

## 2023-12-19 DIAGNOSIS — H02.524: ICD-10-CM

## 2023-12-19 DIAGNOSIS — H02.423: ICD-10-CM

## 2023-12-19 PROCEDURE — 99024 POSTOP FOLLOW-UP VISIT: CPT | Performed by: OPHTHALMOLOGY

## 2023-12-19 ASSESSMENT — REFRACTION_MANIFEST
OD_SPHERE: 0.00
OS_ADD: +2.50
OS_CYLINDER: -1.25
OD_CYLINDER: -2.00
OS_VA1: 20/20
OS_AXIS: 088
OD_SPHERE: PL
OD_ADD: +2.50
OD_AXIS: 110
OS_VA1: 20/25
OD_VA1: 20/25
OS_SPHERE: +0.25
OD_CYLINDER: -1.25
OS_SPHERE: PL
OS_CYLINDER: -2.00
OD_VA1: 20/20
OS_AXIS: 85
OD_AXIS: 115

## 2023-12-19 ASSESSMENT — REFRACTION_CURRENTRX
OD_SPHERE: -0.50
OS_ADD: +2.25
OS_OVR_VA: 20/
OD_AXIS: 112
OS_CYLINDER: -1.00
OS_AXIS: 78
OD_CYLINDER: -1.25
OD_AXIS: 113
OS_CYLINDER: -1.00
OS_SPHERE: +0.50
OD_VPRISM_DIRECTION: PROGS
OS_OVR_VA: 20/
OS_SPHERE: -0.5-
OD_ADD: +2.50
OD_CYLINDER: -1.50
OD_ADD: +2.25
OS_ADD: +2.50
OS_AXIS: 097
OD_SPHERE: +0.50
OD_OVR_VA: 20/
OD_OVR_VA: 20/

## 2023-12-19 ASSESSMENT — CONFRONTATIONAL VISUAL FIELD TEST (CVF)
OD_FINDINGS: FULL
OS_FINDINGS: FULL

## 2023-12-19 ASSESSMENT — REFRACTION_AUTOREFRACTION
OS_CYLINDER: -2.25
OS_AXIS: 094
OS_SPHERE: +0.75
OD_CYLINDER: -2.00
OD_AXIS: 115
OD_SPHERE: +0.25

## 2023-12-19 ASSESSMENT — SPHEQUIV_DERIVED
OD_SPHEQUIV: -1
OS_SPHEQUIV: -0.75
OS_SPHEQUIV: -0.375
OD_SPHEQUIV: -0.75

## 2023-12-19 ASSESSMENT — TEAR BREAK UP TIME (TBUT)
OD_TBUT: 1+
OS_TBUT: 2+

## 2024-01-10 ENCOUNTER — OFFICE (OUTPATIENT)
Dept: URBAN - METROPOLITAN AREA CLINIC 94 | Facility: CLINIC | Age: 79
Setting detail: OPHTHALMOLOGY
End: 2024-01-10
Payer: MEDICARE

## 2024-01-10 DIAGNOSIS — H02.521: ICD-10-CM

## 2024-01-10 DIAGNOSIS — H02.831: ICD-10-CM

## 2024-01-10 DIAGNOSIS — H02.423: ICD-10-CM

## 2024-01-10 DIAGNOSIS — H02.524: ICD-10-CM

## 2024-01-10 DIAGNOSIS — H02.422: ICD-10-CM

## 2024-01-10 DIAGNOSIS — H02.834: ICD-10-CM

## 2024-01-10 DIAGNOSIS — H02.421: ICD-10-CM

## 2024-01-10 PROCEDURE — 99024 POSTOP FOLLOW-UP VISIT: CPT | Performed by: REGISTERED NURSE

## 2024-01-10 ASSESSMENT — REFRACTION_CURRENTRX
OD_ADD: +2.50
OS_OVR_VA: 20/
OS_SPHERE: -0.5-
OS_AXIS: 097
OD_VPRISM_DIRECTION: PROGS
OS_CYLINDER: -1.00
OD_CYLINDER: -1.25
OD_SPHERE: -0.50
OS_AXIS: 78
OS_ADD: +2.50
OS_SPHERE: +0.50
OS_ADD: +2.25
OD_AXIS: 112
OD_OVR_VA: 20/
OD_OVR_VA: 20/
OD_ADD: +2.25
OD_SPHERE: +0.50
OS_OVR_VA: 20/
OD_AXIS: 113
OS_CYLINDER: -1.00
OD_CYLINDER: -1.50

## 2024-01-10 ASSESSMENT — REFRACTION_MANIFEST
OS_AXIS: 85
OS_CYLINDER: -2.00
OS_VA1: 20/20
OD_CYLINDER: -2.00
OD_VA1: 20/25
OD_SPHERE: PL
OD_ADD: +2.50
OS_AXIS: 088
OD_VA1: 20/20
OD_AXIS: 115
OS_ADD: +2.50
OS_VA1: 20/25
OD_AXIS: 110
OD_SPHERE: 0.00
OS_SPHERE: +0.25
OS_CYLINDER: -1.25
OS_SPHERE: PL
OD_CYLINDER: -1.25

## 2024-01-10 ASSESSMENT — REFRACTION_AUTOREFRACTION
OS_CYLINDER: -2.25
OD_CYLINDER: -2.00
OS_SPHERE: +0.75
OS_AXIS: 094
OD_AXIS: 115
OD_SPHERE: +0.25

## 2024-01-10 ASSESSMENT — SPHEQUIV_DERIVED
OD_SPHEQUIV: -0.75
OS_SPHEQUIV: -0.75
OS_SPHEQUIV: -0.375
OD_SPHEQUIV: -1

## 2024-01-10 ASSESSMENT — CONFRONTATIONAL VISUAL FIELD TEST (CVF)
OS_FINDINGS: FULL
OD_FINDINGS: FULL

## 2024-01-10 ASSESSMENT — TEAR BREAK UP TIME (TBUT)
OD_TBUT: 1+
OS_TBUT: 2+

## 2024-01-22 ENCOUNTER — APPOINTMENT (OUTPATIENT)
Dept: PULMONOLOGY | Facility: CLINIC | Age: 79
End: 2024-01-22
Payer: MEDICARE

## 2024-01-22 VITALS
SYSTOLIC BLOOD PRESSURE: 118 MMHG | RESPIRATION RATE: 14 BRPM | DIASTOLIC BLOOD PRESSURE: 68 MMHG | HEART RATE: 62 BPM | OXYGEN SATURATION: 97 %

## 2024-01-22 VITALS — HEIGHT: 55 IN | WEIGHT: 110 LBS | BODY MASS INDEX: 25.46 KG/M2

## 2024-01-22 DIAGNOSIS — R06.02 SHORTNESS OF BREATH: ICD-10-CM

## 2024-01-22 DIAGNOSIS — J45.909 UNSPECIFIED ASTHMA, UNCOMPLICATED: ICD-10-CM

## 2024-01-22 PROCEDURE — 99213 OFFICE O/P EST LOW 20 MIN: CPT | Mod: 25

## 2024-01-22 PROCEDURE — 94010 BREATHING CAPACITY TEST: CPT

## 2024-01-22 PROCEDURE — 85018 HEMOGLOBIN: CPT | Mod: QW

## 2024-01-22 PROCEDURE — 94727 GAS DIL/WSHOT DETER LNG VOL: CPT

## 2024-01-22 PROCEDURE — 94729 DIFFUSING CAPACITY: CPT

## 2024-01-22 RX ORDER — ALENDRONATE SODIUM 70 MG/75ML
SOLUTION ORAL
Refills: 0 | Status: ACTIVE | COMMUNITY

## 2024-01-22 RX ORDER — METFORMIN HYDROCHLORIDE 625 MG/1
TABLET ORAL
Refills: 0 | Status: ACTIVE | COMMUNITY

## 2024-01-22 RX ORDER — FAMOTIDINE 10 MG/1
TABLET, FILM COATED ORAL
Refills: 0 | Status: ACTIVE | COMMUNITY

## 2024-01-22 RX ORDER — ALENDRONATE SODIUM 70 MG/1
70 TABLET ORAL
Refills: 0 | Status: DISCONTINUED | COMMUNITY
End: 2024-01-22

## 2024-01-22 NOTE — ASSESSMENT
[FreeTextEntry1] : 78F PMH former smoker, HTN, HLD, pAFib, who presents for f/u visit.    - PFT today showed FEV1/FVC 75%, FEV1 141%, %, MMEF 75/25 59%, %, VCmax 130%, DLCOc 77% - Although there is no significant obstruction, the MMEF 75/25 is low, indicating small airways disease - There could be an underlying reactive airways or asthma - However her symptom burden is low; advised to use PRN albuterol - Will re-assess in 6 mo time  The patient expressed understanding and agreement with the plan as outlined above, and accepts that it is their responsibility to be compliant with any recommended testing, treatment, and follow-up visits. All relevant questions and concerns were addressed.  20 minutes of time were spent on the encounter. Medical records were reviewed, including but not limited to hospital records, outpatient records, laboratory data, and diagnostic imaging studies. Greater than 50% of the face-to-face encounter time was spent on counseling and/or coordination of care.  Laci Simpson M.D. Pulmonary & Critical Care Medicine [facility] at [place] [address] [place], [place] [address] T: [phone] F: [phone]

## 2024-01-22 NOTE — HISTORY OF PRESENT ILLNESS
[Former] : former [TextBox_4] : 78F PMH former smoker, HTN, HLD, pAFib, who presents for f/u visit.  PFT today showed FEV1/FVC 75%, FEV1 141%, %, MMEF 75/25 59%, %, VCmax 130%, DLCOc 77%. No recent fevers or chills, no recent illness. No N/V/D. She feels well, and has not felt the need to use her albuterol.    [place]  #[id number] [TextBox_11] : 0.05 [TextBox_13] : 5 [YearQuit] : 02/1989

## 2024-01-22 NOTE — RESULTS/DATA
[TextEntry] : [facility] PATIENT NAME: Amarilys Alvarado PATIENT PHONE NUMBER: [phone] PATIENT ID: [id number] : 1945 DATE OF EXAM: 2023 R. Phys. Name: Laci Simpson R. Phys. Address: [address] [place] [address] R Phys. Phone: [phone] CHEST XRAY PA AND LATERAL  HISTORY:  COMPARISON: October 10, 2023  PA and lateral views of the chest were obtained.  FINDINGS: The lungs are clear. No active infiltrate or consolidation is demonstrated. Pulmonary vascularity is normal. The maria elena are not enlarged. The mediastinum is not widened. There is mild uncoiling of aorta. The heart size is normal. The bones appear intact.  IMPRESSION:   No acute pulmonary disease.  No interval change.    Arteriosclerosis (aortic) I70.0  Signed by: Argelia Wall MD Signed Date: 2023 10:45 AM EST    SIGNED BY: Argelia Wall M.D., Ext. [phone] 10:45 AM  ~~~~~~~~~~~~~~~~~~~~~~~~~~~~~~~~~~~~~~~~~~~~~~~~~~~~~~~~~~~~~~~~~~~~~~~~

## 2024-04-16 ENCOUNTER — OFFICE (OUTPATIENT)
Dept: URBAN - METROPOLITAN AREA CLINIC 94 | Facility: CLINIC | Age: 79
Setting detail: OPHTHALMOLOGY
End: 2024-04-16
Payer: MEDICARE

## 2024-04-16 DIAGNOSIS — H02.831: ICD-10-CM

## 2024-04-16 DIAGNOSIS — H04.123: ICD-10-CM

## 2024-04-16 DIAGNOSIS — H16.223: ICD-10-CM

## 2024-04-16 DIAGNOSIS — H02.834: ICD-10-CM

## 2024-04-16 PROCEDURE — 92012 INTRM OPH EXAM EST PATIENT: CPT | Performed by: OPHTHALMOLOGY

## 2024-04-16 PROCEDURE — 83861 MICROFLUID ANALY TEARS: CPT | Performed by: OPHTHALMOLOGY

## 2024-04-25 ENCOUNTER — APPOINTMENT (OUTPATIENT)
Dept: CARDIOLOGY | Facility: CLINIC | Age: 79
End: 2024-04-25

## 2024-05-16 ENCOUNTER — APPOINTMENT (OUTPATIENT)
Dept: ORTHOPEDIC SURGERY | Facility: CLINIC | Age: 79
End: 2024-05-16
Payer: MEDICARE

## 2024-05-16 VITALS
HEIGHT: 55 IN | HEART RATE: 59 BPM | DIASTOLIC BLOOD PRESSURE: 65 MMHG | BODY MASS INDEX: 25.46 KG/M2 | TEMPERATURE: 97.8 F | WEIGHT: 110 LBS | SYSTOLIC BLOOD PRESSURE: 129 MMHG

## 2024-05-16 DIAGNOSIS — M47.816 SPONDYLOSIS W/OUT MYELOPATHY OR RADICULOPATHY, LUMBAR REGION: ICD-10-CM

## 2024-05-16 PROCEDURE — 99204 OFFICE O/P NEW MOD 45 MIN: CPT

## 2024-05-16 NOTE — PHYSICAL EXAM
[de-identified] : CONSTITUTIONAL: Patient is a very pleasant individual who is well-nourished and appears stated age. PSYCHIATRIC: Alert and oriented times three and in no apparent distress, and participates with orthopedic evaluation well. HEAD: Atraumatic and nonsyndromic in appearance. EENT: No thyromegaly, EOMI. RESPIRATORY: Respiratory rate is regular, not dyspneic on examination. LYMPHATICS: There is no cervical or axillary lymphadenopathy. INTEGUMENTARY: Skin is clean, dry, and intact to bilateral lower extremities. VASCULAR: There is brisk capillary refill about the bilateral Lower Extremities with 2+ DP Pulse  Palpation: No significant tenderness lower lumbar spine Muscle Strength Testing: Hip Flexion: 5/5 B/L Knee Extension: 5/5 B/L Knee Flexion: 5/5 B/L Dorsiflexion: 5/5 B/L EHL: 5/5 B/L Plantarflexion: 5/5 B/L  Sensation: SILT L2-S1 B/L except: None  Reflexes: 2+ Quadriceps/Achilles  Gait: Normal gait w/o assistance Able to perform tandem gait Able to Heel Walk Able to Toe Walk  Special Testing:  Negative SLR BLLE Negative clonus BLLE  [de-identified] : X-rays performed in [facility] [place] on4/18/2024 AP lateral oblique views lumbar spine demonstrates grade 1 spondylolisthesis of L5 on S1 measuring 6 mm, facet arthropathy L5-S1 Moderate disc height loss L5-S1

## 2024-05-16 NOTE — DISCUSSION/SUMMARY
[de-identified] :  Tylenol 1000 mg every 8 hours for 3 weeks then as needed thereafter Physical therapy 2-3 times per week for 12 weeks. [facility] service referral was placed to assist with scheduling location I will see her back in 6 weeks

## 2024-05-16 NOTE — HISTORY OF PRESENT ILLNESS
[de-identified] : Chief Complaint: Neck low back and left leg pain   History of Present Illness: 78-year-old female presenting today for initial evaluation for her low back pain and left leg pain.  Patient is [place]-speaking and a  was used for entire today.  She states that this for several months now previously worsening isolated to lower lumbar spine with some radiation into the left gluteal region and left posterior and anterior hamstring she points to her whole leg when she describes her pain.  She has some numbness and tingling down her leg as well but states her whole leg will feel numb.  The symptoms are worse with getting up in the morning they are worse with sitting for long periods or bending over.  She has tried some Tylenol but this gives her limited relief of her symptoms.  She rates the pain as a 5 out of 10 in severity and will fluctuate.  She denies any weakness in the legs.   Past medical history, past surgical history, medications, allergies, social history, and family history are as documented in our records today.  Notable items include: None   Review of Systems: I have reviewed the patient's documented Review of Systems data today, I concur with this documentation.

## 2024-05-16 NOTE — REASON FOR VISIT
[Initial Visit] : an initial visit for [Back Pain] : back pain [[employer] ] : provided by [employer]   [Interpreters_IDNumber] : [id number] [Interpreters_FullName] : Yonatan [[id number]] : [place]

## 2024-05-16 NOTE — ASSESSMENT
[FreeTextEntry1] : 78-year-old female with lumbar spondylosis grade 1 degenerative spondylolisthesis L5 on S1 with a left S1 radiculopathy  The patient and I had an extensive discussion today regarding her concerns.  At present, I am not recommending a surgical intervention.  We discussed non-surgical options that may be available to the patient.  Conservative treatment was discussed with the patient at length. Anticipatory guidance regarding disease process, avoidance of acute exacerbation this was discussed at length and all patients commenting concerns were answered to the patient's satisfaction. Physical therapy for decrease pain and increase function was ordered. Intermittent use of acetaminophen 500 mg 2 tablets t.i.d. p.r.n. mild to moderate pain, ibuprofen 600 mg t.i.d. p.r.n. severe pain with food or milk if there is no medical contraindication. Home exercise including stretching on a daily basis for 20-30 minutes was recommended. Heat, ice, topical were discussed as needed.

## 2024-05-23 NOTE — ED ADULT NURSE NOTE - OBJECTIVE STATEMENT
assumed care of pt at 1350 in cc. MD Nazario called to bedside pt noted to be tachycardic in 160's on cardiac monitor. c/o chest pain radiating to jaw since last night. denies sob or dizziness. pt reports intermediate dyspnea on excretion over last weeks.  pmh of htn and hld. placed on cardiac monitor and . rr even and unlabored. anox4. pt educated on plan of care, pt able to successfully teach back plan of care to RN, RN will continue to reeducate pt during hospital stay. derrick billingsley at bedside. Initiate Treatment: doxycycline hyclate 100 mg capsule. Take one pill po once daily with fully glass of water and after meals.\\n\\nclindamycin 1 %-benzoyl peroxide 5 % topical gel. Apply to face for acne.\\n\\nadapalene 0.1 % topical cream. Apply on face at night time as tolerated Detail Level: Zone Render In Strict Bullet Format?: No Initiate Treatment: ketoconazole 2 % topical cream BID.Apply twice daily for 2 weeks to face

## 2024-06-27 ENCOUNTER — APPOINTMENT (OUTPATIENT)
Dept: ORTHOPEDIC SURGERY | Facility: CLINIC | Age: 79
End: 2024-06-27
Payer: MEDICARE

## 2024-06-27 VITALS
HEIGHT: 55 IN | BODY MASS INDEX: 25.46 KG/M2 | SYSTOLIC BLOOD PRESSURE: 125 MMHG | DIASTOLIC BLOOD PRESSURE: 71 MMHG | WEIGHT: 110 LBS | HEART RATE: 60 BPM | TEMPERATURE: 98.1 F

## 2024-06-27 DIAGNOSIS — M43.17 SPONDYLOLISTHESIS, LUMBOSACRAL REGION: ICD-10-CM

## 2024-06-27 DIAGNOSIS — M54.16 RADICULOPATHY, LUMBAR REGION: ICD-10-CM

## 2024-06-27 PROCEDURE — 99214 OFFICE O/P EST MOD 30 MIN: CPT

## 2024-08-19 ENCOUNTER — APPOINTMENT (OUTPATIENT)
Dept: PULMONOLOGY | Facility: CLINIC | Age: 79
End: 2024-08-19
Payer: MEDICARE

## 2024-08-19 VITALS
SYSTOLIC BLOOD PRESSURE: 118 MMHG | BODY MASS INDEX: 25.46 KG/M2 | HEART RATE: 62 BPM | WEIGHT: 110 LBS | OXYGEN SATURATION: 95 % | HEIGHT: 55 IN | RESPIRATION RATE: 14 BRPM | DIASTOLIC BLOOD PRESSURE: 60 MMHG

## 2024-08-19 PROCEDURE — 99213 OFFICE O/P EST LOW 20 MIN: CPT

## 2024-08-19 PROCEDURE — G2211 COMPLEX E/M VISIT ADD ON: CPT

## 2024-08-19 NOTE — ASSESSMENT
[FreeTextEntry1] : 78F PMH former smoker, mild intermittent asthma, HTN, HLD, pAFib, who presents for f/u visit.   - Overall symptoms are well-controlled - I refilled her Albuterol HFA which she rarely takes - Lungs clear on exam otherwise - Will re-assess in 6 mo time  The patient expressed understanding and agreement with the plan as outlined above and accepts responsibility to be compliant with any recommended testing, treatment, and follow-up visits.  All relevant questions and concerns were addressed.  20 minutes of time were spent on the encounter. Medical records were reviewed, including but not limited to hospital records, outpatient records, laboratory data, and diagnostic imaging studies. Greater than 50% of the face-to-face encounter time was spent on counseling and/or coordination of care.  Laci Simpson MD, [facility] Pulmonary & Critical Care Medicine [facility] at [place] [address] [place], [place] [address] T: [phone] F: [phone]

## 2024-08-19 NOTE — HISTORY OF PRESENT ILLNESS
[Former] : former [TextBox_4] : 78F PMH former smoker, mild intermittent asthma, HTN, HLD, pAFib, who presents for f/u visit. Had wheezing for a couple days recently but resolved. Her symptoms 'come and go' but usually resolve on their own. No fevers or chills. No chest pains. No N/V/D.    #[id number] [TextBox_11] : 0.05 [TextBox_13] : 5 [YearQuit] : 02/1989

## 2025-02-10 ENCOUNTER — LABORATORY RESULT (OUTPATIENT)
Age: 80
End: 2025-02-10

## 2025-02-10 ENCOUNTER — APPOINTMENT (OUTPATIENT)
Dept: PULMONOLOGY | Facility: CLINIC | Age: 80
End: 2025-02-10
Payer: MEDICARE

## 2025-02-10 VITALS
SYSTOLIC BLOOD PRESSURE: 110 MMHG | DIASTOLIC BLOOD PRESSURE: 60 MMHG | WEIGHT: 108 LBS | OXYGEN SATURATION: 97 % | HEIGHT: 55 IN | BODY MASS INDEX: 24.99 KG/M2 | RESPIRATION RATE: 15 BRPM | HEART RATE: 84 BPM

## 2025-02-10 PROCEDURE — 99214 OFFICE O/P EST MOD 30 MIN: CPT

## 2025-02-10 PROCEDURE — G2211 COMPLEX E/M VISIT ADD ON: CPT

## 2025-02-10 RX ORDER — BUDESONIDE AND FORMOTEROL FUMARATE DIHYDRATE 160; 4.5 UG/1; UG/1
160-4.5 AEROSOL RESPIRATORY (INHALATION) TWICE DAILY
Qty: 1 | Refills: 5 | Status: ACTIVE | COMMUNITY
Start: 2025-02-10 | End: 1900-01-01

## 2025-02-10 RX ORDER — PREDNISONE 10 MG/1
10 TABLET ORAL
Qty: 30 | Refills: 1 | Status: ACTIVE | COMMUNITY
Start: 2025-02-10 | End: 1900-01-01

## 2025-02-12 LAB
A ALTERNATA IGE QN: <0.1 KUA/L
A FUMIGATUS IGE QN: <0.1 KUA/L
BASOPHILS # BLD AUTO: 0.09 K/UL
BASOPHILS NFR BLD AUTO: 0.8 %
BERMUDA GRASS IGE QN: <0.1 KUA/L
BOXELDER IGE QN: <0.1 KUA/L
C HERBARUM IGE QN: <0.1 KUA/L
CALIF WALNUT IGE QN: <0.1 KUA/L
CAT DANDER IGE QN: <0.1 KUA/L
CMN PIGWEED IGE QN: <0.1 KUA/L
COMMON RAGWEED IGE QN: <0.1 KUA/L
COTTONWOOD IGE QN: <0.1 KUA/L
D FARINAE IGE QN: <0.1 KUA/L
D PTERONYSS IGE QN: <0.1 KUA/L
DEPRECATED A ALTERNATA IGE RAST QL: 0
DEPRECATED A FUMIGATUS IGE RAST QL: 0
DEPRECATED BERMUDA GRASS IGE RAST QL: 0
DEPRECATED BOXELDER IGE RAST QL: 0
DEPRECATED C HERBARUM IGE RAST QL: 0
DEPRECATED CAT DANDER IGE RAST QL: 0
DEPRECATED COMMON PIGWEED IGE RAST QL: 0
DEPRECATED COMMON RAGWEED IGE RAST QL: 0
DEPRECATED COTTONWOOD IGE RAST QL: 0
DEPRECATED D FARINAE IGE RAST QL: 0
DEPRECATED D PTERONYSS IGE RAST QL: 0
DEPRECATED DOG DANDER IGE RAST QL: 0
DEPRECATED GOOSEFOOT IGE RAST QL: 0
DEPRECATED LONDON PLANE IGE RAST QL: 0
DEPRECATED MOUSE URINE PROT IGE RAST QL: 0
DEPRECATED MUGWORT IGE RAST QL: 0
DEPRECATED P NOTATUM IGE RAST QL: 0
DEPRECATED RED CEDAR IGE RAST QL: 0
DEPRECATED ROACH IGE RAST QL: 0
DEPRECATED SHEEP SORREL IGE RAST QL: 0
DEPRECATED SILVER BIRCH IGE RAST QL: 0
DEPRECATED TIMOTHY IGE RAST QL: 0
DEPRECATED WHITE ASH IGE RAST QL: 0
DEPRECATED WHITE OAK IGE RAST QL: 0
DOG DANDER IGE QN: <0.1 KUA/L
EOSINOPHIL # BLD AUTO: 0.22 K/UL
EOSINOPHIL NFR BLD AUTO: 2 %
GOOSEFOOT IGE QN: <0.1 KUA/L
HCT VFR BLD CALC: 41.4 %
HGB BLD-MCNC: 13 G/DL
IMM GRANULOCYTES NFR BLD AUTO: 0.3 %
LONDON PLANE IGE QN: <0.1 KUA/L
LYMPHOCYTES # BLD AUTO: 3.39 K/UL
LYMPHOCYTES NFR BLD AUTO: 30.3 %
MAN DIFF?: NORMAL
MCHC RBC-ENTMCNC: 27.6 PG
MCHC RBC-ENTMCNC: 31.4 G/DL
MCV RBC AUTO: 87.9 FL
MONOCYTES # BLD AUTO: 0.75 K/UL
MONOCYTES NFR BLD AUTO: 6.7 %
MOUSE URINE PROT IGE QN: <0.1 KUA/L
MUGWORT IGE QN: <0.1 KUA/L
MULBERRY (T70) CLASS: 0
MULBERRY (T70) CONC: <0.1 KUA/L
NEUTROPHILS # BLD AUTO: 6.69 K/UL
NEUTROPHILS NFR BLD AUTO: 59.9 %
P NOTATUM IGE QN: <0.1 KUA/L
PLATELET # BLD AUTO: 328 K/UL
RBC # BLD: 4.71 M/UL
RBC # FLD: 13.5 %
RED CEDAR IGE QN: <0.1 KUA/L
ROACH IGE QN: <0.1 KUA/L
SHEEP SORREL IGE QN: <0.1 KUA/L
SILVER BIRCH IGE QN: <0.1 KUA/L
TIMOTHY IGE QN: <0.1 KUA/L
TOTAL IGE SMQN RAST: 4 KU/L
TREE ALLERG MIX1 IGE QL: 0
WBC # FLD AUTO: 11.17 K/UL
WHITE ASH IGE QN: <0.1 KUA/L
WHITE ELM IGE QN: 0
WHITE ELM IGE QN: <0.1 KUA/L
WHITE OAK IGE QN: <0.1 KUA/L

## 2025-02-22 LAB — IGE AB SERPL QL: 26 NG/ML

## 2025-03-24 ENCOUNTER — APPOINTMENT (OUTPATIENT)
Dept: PULMONOLOGY | Facility: CLINIC | Age: 80
End: 2025-03-24
Payer: MEDICARE

## 2025-03-24 VITALS
DIASTOLIC BLOOD PRESSURE: 62 MMHG | HEART RATE: 78 BPM | BODY MASS INDEX: 24.3 KG/M2 | WEIGHT: 105 LBS | OXYGEN SATURATION: 98 % | RESPIRATION RATE: 14 BRPM | SYSTOLIC BLOOD PRESSURE: 120 MMHG | HEIGHT: 55 IN

## 2025-03-24 PROCEDURE — G2211 COMPLEX E/M VISIT ADD ON: CPT

## 2025-03-24 PROCEDURE — 99214 OFFICE O/P EST MOD 30 MIN: CPT

## 2025-04-25 ENCOUNTER — OFFICE (OUTPATIENT)
Dept: URBAN - METROPOLITAN AREA CLINIC 94 | Facility: CLINIC | Age: 80
Setting detail: OPHTHALMOLOGY
End: 2025-04-25
Payer: MEDICARE

## 2025-04-25 DIAGNOSIS — H02.831: ICD-10-CM

## 2025-04-25 DIAGNOSIS — H02.524: ICD-10-CM

## 2025-04-25 DIAGNOSIS — H02.521: ICD-10-CM

## 2025-04-25 DIAGNOSIS — H02.423: ICD-10-CM

## 2025-04-25 PROBLEM — H10.45 ALLERGIC CONJUNCTIVITIS: Status: ACTIVE | Noted: 2025-04-25

## 2025-04-25 PROCEDURE — 99213 OFFICE O/P EST LOW 20 MIN: CPT | Performed by: OPHTHALMOLOGY

## 2025-04-25 ASSESSMENT — REFRACTION_CURRENTRX
OD_OVR_VA: 20/
OD_VPRISM_DIRECTION: PROGS
OS_CYLINDER: -1.00
OD_CYLINDER: -1.25
OD_SPHERE: -0.50
OS_CYLINDER: -1.00
OS_OVR_VA: 20/
OS_OVR_VA: 20/
OD_ADD: +2.25
OD_ADD: +2.50
OS_ADD: +2.50
OD_AXIS: 112
OD_OVR_VA: 20/
OS_AXIS: 78
OD_SPHERE: +0.50
OS_ADD: +2.25
OD_AXIS: 113
OD_CYLINDER: -1.50
OS_SPHERE: +0.50
OS_AXIS: 097
OS_SPHERE: -0.5-

## 2025-04-25 ASSESSMENT — REFRACTION_MANIFEST
OS_ADD: +2.50
OS_SPHERE: +0.25
OD_VA1: 20/25
OD_ADD: +2.50
OS_VA1: 20/20
OD_CYLINDER: -1.25
OD_SPHERE: 0.00
OS_CYLINDER: -1.25
OD_CYLINDER: -2.00
OS_CYLINDER: -2.00
OD_SPHERE: PL
OD_AXIS: 115
OS_SPHERE: PL
OD_VA1: 20/20
OS_AXIS: 088
OD_AXIS: 110
OS_VA1: 20/25
OS_AXIS: 85

## 2025-04-25 ASSESSMENT — SUPERFICIAL PUNCTATE KERATITIS (SPK)
OD_SPK: 2+
OS_SPK: 2+

## 2025-04-25 ASSESSMENT — CONFRONTATIONAL VISUAL FIELD TEST (CVF)
OS_FINDINGS: FULL
OD_FINDINGS: FULL

## 2025-04-25 ASSESSMENT — REFRACTION_AUTOREFRACTION
OD_SPHERE: +0.25
OS_CYLINDER: -2.25
OS_AXIS: 094
OD_CYLINDER: -2.00
OD_AXIS: 115
OS_SPHERE: +0.75

## 2025-04-25 ASSESSMENT — VISUAL ACUITY
OS_BCVA: 20/25-1
OD_BCVA: 20/25-1

## 2025-04-25 ASSESSMENT — KERATOMETRY
OS_AXISANGLE_DEGREES: 180
OD_K1POWER_DIOPTERS: 43.50
OS_K2POWER_DIOPTERS: 45.00
OD_AXISANGLE_DEGREES: 030
OD_K2POWER_DIOPTERS: 44.75
OS_K1POWER_DIOPTERS: 44.00
METHOD_AUTO_MANUAL: AUTO

## 2025-04-25 ASSESSMENT — TONOMETRY
OS_IOP_MMHG: 15
OD_IOP_MMHG: 16

## 2025-04-25 ASSESSMENT — TEAR BREAK UP TIME (TBUT)
OS_TBUT: 1+
OD_TBUT: 1+

## 2025-05-23 ENCOUNTER — OFFICE (OUTPATIENT)
Dept: URBAN - METROPOLITAN AREA CLINIC 94 | Facility: CLINIC | Age: 80
Setting detail: OPHTHALMOLOGY
End: 2025-05-23
Payer: MEDICARE

## 2025-05-23 DIAGNOSIS — H02.524: ICD-10-CM

## 2025-05-23 DIAGNOSIS — H02.423: ICD-10-CM

## 2025-05-23 DIAGNOSIS — H11.152: ICD-10-CM

## 2025-05-23 DIAGNOSIS — H02.521: ICD-10-CM

## 2025-05-23 PROBLEM — H11.043 PTERYGIUM; BOTH EYES: Status: ACTIVE | Noted: 2025-05-23

## 2025-05-23 PROCEDURE — 92012 INTRM OPH EXAM EST PATIENT: CPT | Performed by: OPHTHALMOLOGY

## 2025-05-23 ASSESSMENT — VISUAL ACUITY
OS_BCVA: 20/40
OD_BCVA: 20/60

## 2025-05-23 ASSESSMENT — REFRACTION_CURRENTRX
OS_ADD: +2.50
OD_AXIS: 113
OD_AXIS: 112
OD_SPHERE: -0.50
OD_CYLINDER: -1.50
OS_CYLINDER: -1.00
OD_CYLINDER: -1.25
OD_SPHERE: +0.50
OS_ADD: +2.25
OS_CYLINDER: -1.00
OS_OVR_VA: 20/
OD_OVR_VA: 20/
OS_SPHERE: -0.5-
OS_AXIS: 78
OS_SPHERE: +0.50
OD_ADD: +2.50
OD_OVR_VA: 20/
OS_AXIS: 097
OD_ADD: +2.25
OS_OVR_VA: 20/
OD_VPRISM_DIRECTION: PROGS

## 2025-05-23 ASSESSMENT — KERATOMETRY
OS_AXISANGLE_DEGREES: 180
OS_K1POWER_DIOPTERS: 44.00
METHOD_AUTO_MANUAL: AUTO
OS_K2POWER_DIOPTERS: 45.00
OD_K2POWER_DIOPTERS: 44.75
OD_K1POWER_DIOPTERS: 43.50
OD_AXISANGLE_DEGREES: 030

## 2025-05-23 ASSESSMENT — REFRACTION_MANIFEST
OD_CYLINDER: -2.00
OD_ADD: +2.50
OS_SPHERE: +0.25
OD_VA1: 20/25
OS_AXIS: 85
OS_CYLINDER: -2.00
OD_AXIS: 110
OS_VA1: 20/20
OS_AXIS: 088
OS_CYLINDER: -1.25
OD_SPHERE: PL
OS_ADD: +2.50
OD_VA1: 20/20
OD_AXIS: 115
OS_VA1: 20/25
OD_CYLINDER: -1.25
OS_SPHERE: PL
OD_SPHERE: 0.00

## 2025-05-23 ASSESSMENT — REFRACTION_AUTOREFRACTION
OD_CYLINDER: -2.00
OD_AXIS: 115
OD_SPHERE: +0.25
OS_CYLINDER: -2.25
OS_SPHERE: +0.75
OS_AXIS: 094

## 2025-05-23 ASSESSMENT — TONOMETRY
OS_IOP_MMHG: 17
OD_IOP_MMHG: 15

## 2025-05-23 ASSESSMENT — CONFRONTATIONAL VISUAL FIELD TEST (CVF)
OS_FINDINGS: FULL
OD_FINDINGS: FULL

## 2025-05-23 ASSESSMENT — SUPERFICIAL PUNCTATE KERATITIS (SPK)
OS_SPK: 2+
OD_SPK: 2+

## 2025-05-23 ASSESSMENT — TEAR BREAK UP TIME (TBUT)
OS_TBUT: 1+
OD_TBUT: 1+

## 2025-05-23 ASSESSMENT — CORNEAL PTERYGIUM: OD_PTERYGIUM: NASAL 1MM

## 2025-06-27 ENCOUNTER — OFFICE (OUTPATIENT)
Dept: URBAN - METROPOLITAN AREA CLINIC 94 | Facility: CLINIC | Age: 80
Setting detail: OPHTHALMOLOGY
End: 2025-06-27
Payer: MEDICARE

## 2025-06-27 DIAGNOSIS — H02.423: ICD-10-CM

## 2025-06-27 DIAGNOSIS — H02.524: ICD-10-CM

## 2025-06-27 DIAGNOSIS — H11.152: ICD-10-CM

## 2025-06-27 DIAGNOSIS — H02.521: ICD-10-CM

## 2025-06-27 PROCEDURE — 92285 EXTERNAL OCULAR PHOTOGRAPHY: CPT | Performed by: OPHTHALMOLOGY

## 2025-06-27 PROCEDURE — 92012 INTRM OPH EXAM EST PATIENT: CPT | Performed by: OPHTHALMOLOGY

## 2025-06-27 ASSESSMENT — REFRACTION_MANIFEST
OS_SPHERE: +0.25
OD_AXIS: 115
OS_AXIS: 85
OD_CYLINDER: -2.00
OS_VA1: 20/20
OD_CYLINDER: -1.25
OS_VA1: 20/25
OS_CYLINDER: -2.00
OD_VA1: 20/25
OS_AXIS: 088
OD_SPHERE: PL
OD_VA1: 20/20
OS_CYLINDER: -1.25
OS_ADD: +2.50
OS_SPHERE: PL
OD_SPHERE: 0.00
OD_ADD: +2.50
OD_AXIS: 110

## 2025-06-27 ASSESSMENT — SUPERFICIAL PUNCTATE KERATITIS (SPK)
OD_SPK: 2+
OS_SPK: 2+

## 2025-06-27 ASSESSMENT — KERATOMETRY
OD_K2POWER_DIOPTERS: 44.75
OS_AXISANGLE_DEGREES: 180
METHOD_AUTO_MANUAL: AUTO
OD_K1POWER_DIOPTERS: 43.50
OD_AXISANGLE_DEGREES: 030
OS_K1POWER_DIOPTERS: 44.00
OS_K2POWER_DIOPTERS: 45.00

## 2025-06-27 ASSESSMENT — REFRACTION_CURRENTRX
OD_SPHERE: -0.50
OD_AXIS: 112
OS_ADD: +2.25
OD_CYLINDER: -1.25
OD_AXIS: 113
OD_OVR_VA: 20/
OS_SPHERE: +0.50
OD_VPRISM_DIRECTION: PROGS
OD_SPHERE: +0.50
OS_OVR_VA: 20/
OD_ADD: +2.25
OS_OVR_VA: 20/
OD_ADD: +2.50
OS_CYLINDER: -1.00
OS_AXIS: 78
OD_OVR_VA: 20/
OD_CYLINDER: -1.50
OS_AXIS: 097
OS_CYLINDER: -1.00
OS_SPHERE: -0.5-
OS_ADD: +2.50

## 2025-06-27 ASSESSMENT — TEAR BREAK UP TIME (TBUT)
OS_TBUT: 1+
OD_TBUT: 1+

## 2025-06-27 ASSESSMENT — TONOMETRY
OS_IOP_MMHG: 17
OD_IOP_MMHG: 15

## 2025-06-27 ASSESSMENT — VISUAL ACUITY
OS_BCVA: 20/40
OD_BCVA: 20/60

## 2025-06-27 ASSESSMENT — REFRACTION_AUTOREFRACTION
OS_SPHERE: +0.75
OS_AXIS: 095
OS_CYLINDER: -2.25
OD_CYLINDER: -2.00
OD_SPHERE: +0.25
OD_AXIS: 115

## 2025-06-27 ASSESSMENT — CORNEAL PTERYGIUM: OD_PTERYGIUM: NASAL 1MM

## 2025-07-28 ENCOUNTER — APPOINTMENT (OUTPATIENT)
Dept: PULMONOLOGY | Facility: CLINIC | Age: 80
End: 2025-07-28
Payer: MEDICARE

## 2025-07-28 VITALS
HEART RATE: 68 BPM | WEIGHT: 112 LBS | OXYGEN SATURATION: 98 % | DIASTOLIC BLOOD PRESSURE: 70 MMHG | SYSTOLIC BLOOD PRESSURE: 116 MMHG | BODY MASS INDEX: 25.92 KG/M2 | RESPIRATION RATE: 16 BRPM | HEIGHT: 55 IN

## 2025-07-28 PROCEDURE — G2211 COMPLEX E/M VISIT ADD ON: CPT

## 2025-07-28 PROCEDURE — 99213 OFFICE O/P EST LOW 20 MIN: CPT
